# Patient Record
Sex: FEMALE | Race: BLACK OR AFRICAN AMERICAN | Employment: FULL TIME | URBAN - METROPOLITAN AREA
[De-identification: names, ages, dates, MRNs, and addresses within clinical notes are randomized per-mention and may not be internally consistent; named-entity substitution may affect disease eponyms.]

---

## 2017-02-10 ENCOUNTER — OFFICE VISIT (OUTPATIENT)
Dept: FAMILY MEDICINE CLINIC | Age: 27
End: 2017-02-10

## 2017-02-10 VITALS
SYSTOLIC BLOOD PRESSURE: 109 MMHG | WEIGHT: 141 LBS | HEART RATE: 80 BPM | HEIGHT: 63 IN | OXYGEN SATURATION: 99 % | RESPIRATION RATE: 20 BRPM | TEMPERATURE: 98 F | DIASTOLIC BLOOD PRESSURE: 76 MMHG | BODY MASS INDEX: 24.98 KG/M2

## 2017-02-10 DIAGNOSIS — M67.471 GANGLION CYST OF RIGHT FOOT: ICD-10-CM

## 2017-02-10 DIAGNOSIS — S99.921A RIGHT FOOT INJURY, INITIAL ENCOUNTER: Primary | ICD-10-CM

## 2017-02-10 NOTE — PROGRESS NOTES
HISTORY OF PRESENT ILLNESS  Charlotte Stewart is a 27 y.o. female. HPI  Charlotte Stewart is a 27 y.o. female who presents to the office today for toe injury. She is a new patient. Her Mother in law and grandmother are patients here. She comes in with c/o right foot pain. Yesterday she was vacuuming and was pulling open a door and the door slammed into the lateral side of her right foot and pinky. 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 Since the injury she has not been able to stand on her right foot. There is swelling and bruising. She did not take medication for the pain. Chief Complaint   Patient presents with   Judieth Marleny Establish Care    Foot Pain     No current outpatient prescriptions on file prior to visit. No current facility-administered medications on file prior to visit. No Known Allergies  No past medical history on file. History   Smoking Status    Never Smoker   Smokeless Tobacco    Never Used     History   Alcohol Use No     No family history on file. Review of Systems   Musculoskeletal: Positive for joint pain. Right foot and toes   Neurological: Negative for tingling, sensory change and focal weakness. Visit Vitals    /76 (BP 1 Location: Right arm, BP Patient Position: Sitting)    Pulse 80    Temp 98 °F (36.7 °C) (Oral)    Resp 20    Ht 5' 3\" (1.6 m)    Wt 141 lb (64 kg)    LMP 01/01/2017 (Exact Date)    SpO2 99%    BMI 24.98 kg/m2     Physical Exam   Constitutional: She is oriented to person, place, and time. She appears well-developed and well-nourished. No distress. Cardiovascular: Normal rate. Pulmonary/Chest: Effort normal. No respiratory distress. Musculoskeletal: She exhibits no edema. Right ankle: Normal.        Right foot: There is decreased range of motion, tenderness, bony tenderness and swelling. There is no deformity.         Feet:    limping   Neurological: She is alert and oriented to person, place, and time. Psychiatric: She has a normal mood and affect. Her behavior is normal. Thought content normal.   Nursing note and vitals reviewed. ASSESSMENT and PLAN    ICD-10-CM ICD-9-CM    1. Right foot injury, initial encounter S99.921A 959.7 XR FOOT RT MIN 3 V   2. Ganglion cyst of right foot M67.471 727.43      Xray of right foot. Will call with results. Rt foot was wrapped in an ace bandage. She will take NSAIDs for pain and swelling, keep foot elevated and apply ice. I gave her a work note to return on Monday if tolerable. Patient agrees with Plan and verbalizes understanding. Follow-up Disposition:  Return in about 3 weeks (around 3/3/2017) for foot injury/pain.       Joyce Hidalgo PA-C  02/10/17

## 2017-02-10 NOTE — LETTER
NOTIFICATION RETURN TO WORK  
 
2/10/2017 2:52 PM 
 
Ms. Sweetie Peres [address] [address] To Whom It May Concern: 
 
Sweetie Peres is currently under the care of Floyd Dickerson. She will return to work on: 2/13/2017 If there are questions or concerns please have the patient contact our office.  
 
 
 
Sincerely, 
 
 
Carey Bernal PA-C

## 2017-02-10 NOTE — PATIENT INSTRUCTIONS

## 2017-02-10 NOTE — PROGRESS NOTES
Renetta Singleton is a 27 y.o. female in today to establish care. Patient has c/o right small toe injury. Learning assessment completed; primary language is [place]. Fall Risk Assessment, last 12 mths 2/10/2017   Able to walk? Yes   Fall in past 12 months? Yes   Fall with injury?  Yes   Number of falls in past 12 months 1   Fall Risk Score 2

## 2017-02-10 NOTE — MR AVS SNAPSHOT
Visit Information Date & Time Provider Department Dept. Phone Encounter #  
 2/10/2017  2:00 PM Samm Blackman PA-C [address] [phone] [id number] Follow-up Instructions Return in about 3 weeks (around 3/3/2017) for foot injury/pain. Upcoming Health Maintenance Date Due DTaP/Tdap/Td series (1 - Tdap) 5/28/2009 PAP AKA CERVICAL CYTOLOGY 5/28/2009 Allergies as of 2/10/2017  Review Complete On: 2/10/2017 By: Samm Blackman PA-C No Known Allergies Current Immunizations  Never Reviewed No immunizations on file. Not reviewed this visit You Were Diagnosed With   
  
 Codes Comments Right foot injury, initial encounter    -  Primary ICD-10-CM: H78.055B ICD-9-CM: 203. 7 Vitals BP Pulse Temp Resp Height(growth percentile) Weight(growth percentile) 109/76 (BP 1 Location: Right arm, BP Patient Position: Sitting) 80 98 °F (36.7 °C) (Oral) 20 5' 3\" (1.6 m) 141 lb (64 kg) LMP SpO2 BMI OB Status Smoking Status 01/01/2017 (Exact Date) 99% 24.98 kg/m2 Having regular periods Never Smoker Vitals History BMI and BSA Data Body Mass Index Body Surface Area 24.98 kg/m 2 1.69 m 2 Your Updated Medication List  
  
Notice  As of 2/10/2017  2:53 PM  
 You have not been prescribed any medications. Follow-up Instructions Return in about 3 weeks (around 3/3/2017) for foot injury/pain. To-Do List   
 02/10/2017 Imaging:  XR FOOT RT MIN 3 V Patient Instructions Foot Pain: Care Instructions Your Care Instructions Foot injuries that cause pain and swelling are fairly common. Almost all sports or home repair projects can cause a misstep that ends up as foot pain. Normal wear and tear, especially as you get older, also can cause foot pain.  
Most minor foot injuries will heal on their own, and home treatment is usually all you need to do. If you have a severe injury, you may need tests and treatment. Follow-up care is a key part of your treatment and safety. Be sure to make and go to all appointments, and call your doctor if you are having problems. Its also a good idea to know your test results and keep a list of the medicines you take. How can you care for yourself at home? · Take pain medicines exactly as directed. ¨ If the doctor gave you a prescription medicine for pain, take it as prescribed. ¨ If you are not taking a prescription pain medicine, ask your doctor if you can take an over-the-counter medicine. · Rest and protect your foot. Take a break from any activity that may cause pain. · Put ice or a cold pack on your foot for 10 to 20 minutes at a time. Put a thin cloth between the ice and your skin. · Prop up the sore foot on a pillow when you ice it or anytime you sit or lie down during the next 3 days. Try to keep it above the level of your heart. This will help reduce swelling. · Your doctor may recommend that you wrap your foot with an elastic bandage. Keep your foot wrapped for as long as your doctor advises. · If your doctor recommends crutches, use them as directed. · Wear roomy footwear. · As soon as pain and swelling end, begin gentle exercises of your foot. Your doctor can tell you which exercises will help. When should you call for help? Call 911 anytime you think you may need emergency care. For example, call if: 
· Your foot turns pale, white, blue, or cold. Call your doctor now or seek immediate medical care if: 
· You cannot move or stand on your foot. · Your foot looks twisted or out of its normal position. · Your foot is not stable when you step down. · You have signs of infection, such as: 
¨ Increased pain, swelling, warmth, or redness. ¨ Red streaks leading from the sore area. ¨ Pus draining from a place on your foot. ¨ A fever. · Your foot is numb or tingly. Watch closely for changes in your health, and be sure to contact your doctor if: 
· You do not get better as expected. · You have bruises from an injury that last longer than 2 weeks. Where can you learn more? Go to http://anum-dannielle.info/. Enter F007 in the search box to learn more about \"Foot Pain: Care Instructions. \" Current as of: May 23, 2016 Content Version: 11.1 © 9961-0502 [employer]aki. Care instructions adapted under license by [employer] (which disclaims liability or warranty for this information). If you have questions about a medical condition or this instruction, always ask your healthcare professional. [address] any warranty or liability for your use of this information. Introducing [facility]! Terence Trujillo introduces Little Big Things patient portal. Now you can access parts of your medical record, email your doctor's office, and request medication refills online. 1. In your internet browser, go to [email or web address] 2. Click on the First Time User? Click Here link in the Sign In box. You will see the New Member Sign Up page. 3. Enter your Little Big Things Access Code exactly as it appears below. You will not need to use this code after youve completed the sign-up process. If you do not sign up before the expiration date, you must request a new code. · Little Big Things Access Code: [email or web address] Expires: 5/11/2017  2:53 PM 
 
4. Enter the last four digits of your Social Security Number (xxxx) and Date of Birth (mm/dd/yyyy) as indicated and click Submit. You will be taken to the next sign-up page. 5. Create a Cyntellectt ID. This will be your Little Big Things login ID and cannot be changed, so think of one that is secure and easy to remember. 6. Create a Little Big Things password. You can change your password at any time. 7. Enter your Password Reset Question and Answer.  This can be used at a later time if you forget your password. 8. Enter your e-mail address. You will receive e-mail notification when new information is available in [address] 9. Click Sign Up. You can now view and download portions of your medical record. 10. Click the Download Summary menu link to download a portable copy of your medical information. If you have questions, please visit the Frequently Asked Questions section of the Kanbox website. Remember, Kanbox is NOT to be used for urgent needs. For medical emergencies, dial 911. Now available from your iPhone and Android! Please provide this summary of care documentation to your next provider. Your primary care clinician is listed as Kodak Lyn. If you have any questions after today's visit, please call [phone].

## 2017-02-16 DIAGNOSIS — M79.671 RIGHT FOOT PAIN: ICD-10-CM

## 2017-02-16 DIAGNOSIS — T14.8XXA AVULSION FRACTURE OF BONE: Primary | ICD-10-CM

## 2017-02-28 ENCOUNTER — CLINICAL SUPPORT (OUTPATIENT)
Dept: FAMILY MEDICINE CLINIC | Age: 27
End: 2017-02-28

## 2017-02-28 DIAGNOSIS — Z11.1 ENCOUNTER FOR PPD TEST: Primary | ICD-10-CM

## 2017-02-28 NOTE — PROGRESS NOTES
William Duque is a 27 y.o. female in today for PPD placement only. Patient tolerated well, in no apparent distress, aware of need to return for reading in 48-72 hours.

## 2017-03-03 ENCOUNTER — CLINICAL SUPPORT (OUTPATIENT)
Dept: FAMILY MEDICINE CLINIC | Age: 27
End: 2017-03-03

## 2017-03-03 DIAGNOSIS — Z11.1 ENCOUNTER FOR PPD SKIN TEST READING: Primary | ICD-10-CM

## 2017-03-03 LAB
MM INDURATION POC: 0 MM (ref 0–5)
PPD POC: NEGATIVE NEGATIVE

## 2017-03-03 NOTE — PROGRESS NOTES
Stacy Huizar is a 27 y.o. female here this morning to have her PPD. Test was negative with 0 mm of induration. Results printed and given to patient.

## 2017-04-04 ENCOUNTER — OFFICE VISIT (OUTPATIENT)
Dept: FAMILY MEDICINE CLINIC | Age: 27
End: 2017-04-04

## 2017-04-04 VITALS
WEIGHT: 139.4 LBS | TEMPERATURE: 98.5 F | DIASTOLIC BLOOD PRESSURE: 63 MMHG | OXYGEN SATURATION: 99 % | HEART RATE: 78 BPM | BODY MASS INDEX: 24.7 KG/M2 | HEIGHT: 63 IN | SYSTOLIC BLOOD PRESSURE: 113 MMHG | RESPIRATION RATE: 18 BRPM

## 2017-04-04 DIAGNOSIS — Z13.220 SCREENING, LIPID: ICD-10-CM

## 2017-04-04 DIAGNOSIS — M25.562 CHRONIC PAIN OF LEFT KNEE: ICD-10-CM

## 2017-04-04 DIAGNOSIS — Z01.419 WELL WOMAN EXAM WITH ROUTINE GYNECOLOGICAL EXAM: Primary | ICD-10-CM

## 2017-04-04 DIAGNOSIS — Z12.4 SCREENING FOR CERVICAL CANCER: ICD-10-CM

## 2017-04-04 DIAGNOSIS — Z11.3 SCREENING FOR STD (SEXUALLY TRANSMITTED DISEASE): ICD-10-CM

## 2017-04-04 DIAGNOSIS — G89.29 CHRONIC PAIN OF LEFT KNEE: ICD-10-CM

## 2017-04-04 DIAGNOSIS — Z01.419 WELL WOMAN EXAM WITH ROUTINE GYNECOLOGICAL EXAM: ICD-10-CM

## 2017-04-04 NOTE — MR AVS SNAPSHOT
Visit Information Date & Time Provider Department Dept. Phone Encounter #  
 4/4/2017  2:30 PM Addi Dutta PA-C [employer] [phone] [id number] Follow-up Instructions Return in about 1 year (around 4/4/2018) for Well Woman. Follow-up and Disposition History Upcoming Health Maintenance Date Due  
 PAP AKA CERVICAL CYTOLOGY 4/4/2020 DTaP/Tdap/Td series (2 - Td) 11/4/2023 Allergies as of 4/4/2017  Review Complete On: 4/4/2017 By: Addi Dutta PA-C No Known Allergies Current Immunizations  Reviewed on 2/28/2017 Name Date  
 TB Skin Test (PPD) Intradermal 2/28/2017  4:45 PM  
  
 Not reviewed this visit You Were Diagnosed With   
  
 Codes Comments Well woman exam with routine gynecological exam    -  Primary ICD-10-CM: F97.543 ICD-9-CM: V72.31 Screening for cervical cancer     ICD-10-CM: Z12.4 ICD-9-CM: V76.2 Screening, lipid     ICD-10-CM: C06.977 ICD-9-CM: V77.91 Chronic pain of left knee     ICD-10-CM: M25.562, G89.29 ICD-9-CM: 719.46, 338.29 Screening for STD (sexually transmitted disease)     ICD-10-CM: Z11.3 ICD-9-CM: V74.5 Vitals BP Pulse Temp Resp Height(growth percentile) Weight(growth percentile) 113/63 (BP 1 Location: Left arm, BP Patient Position: Sitting) 78 98.5 °F (36.9 °C) (Oral) 18 5' 3\" (1.6 m) 139 lb 6.4 oz (63.2 kg) LMP SpO2 BMI OB Status Smoking Status 03/17/2017 (Exact Date) 99% 24.69 kg/m2 Having regular periods Never Smoker Vitals History BMI and BSA Data Body Mass Index Body Surface Area  
 24.69 kg/m 2 1.68 m 2 Your Updated Medication List  
  
Notice  As of 4/4/2017  3:14 PM  
 You have not been prescribed any medications. We Performed the Following CBC WITH AUTOMATED DIFF [06684 CPT(R)] Follow-up Instructions Return in about 1 year (around 4/4/2018) for Well Woman. To-Do List   
 04/04/2017 Lab:  BV+CT+NG+TV BY JUAN + YEAST   
  
 04/04/2017 Lab:  HIV 1/2 AB SCREEN W RFLX CONFIRM   
  
 04/04/2017 Lab:  LIPID PANEL   
  
 04/04/2017 Lab:  METABOLIC PANEL, COMPREHENSIVE   
  
 04/04/2017 Pathology:  PAP IG, CT-NG-TV, APTIMA HPV AND [address] 14[birth year]30[birth year]([id number]) 04/04/2017 Lab:  RPR   
  
 04/04/2017 Lab:  TSH 3RD GENERATION   
  
 04/04/2017 Lab:  VITAMIN D, 25 HYDROXY Patient Instructions Learning About Pap Tests What is a Pap test? 
The Pap test (also called a Pap smear) is a screening test for cancer of the cervix, which is the lower part of the uterus that opens into the vagina. The test can help your doctor find early changes in the cells that could lead to cancer. During the test, the doctor or nurse will insert a tool called a speculum into your vagina. The speculum gently spreads apart the vaginal walls. That allows your doctor to see inside the vagina and the cervix. He or she uses a cotton swab or brush to collect cell samples from your cervix. Try to schedule the test when you're not having your period. To get ready for a Pap test, avoid douches, tampons, vaginal medicines, sprays, or powders for at least a day before you have the test. 
When should you have a Pap test? 
Women should start having Pap tests at age 24. If you are younger than 24 and are sexually active, it's still a good idea to have regular testing for sexually transmitted infections. · Women 21 to 30 should have Pap tests every 3 years. · Women 30 to 72 may continue to have a Pap test every 3 years as long as their results are normal. Or they can choose to have a Pap test and an HPV test. This is called co-testing. With co-testing, women can have screening every 5 years as long as their test results are normal. 
· Women 72 and older may no longer need Pap tests.  When to stop having Pap tests depends on your medical history, your overall health, and your risk of cervical cell changes or cervical cancer. Talk with your doctor about whether you should stop or continue to have Pap tests. He or she can help you decide. These recommendations do not apply to women who have had a serious abnormal Pap test result or who have certain health problems. Talk to your doctor about how often you should be tested. There is also a newer test called a primary HPV test. It is used in women ages 22 and older. And it is done every 3 years, as long as a woman's test results are normal. A woman who has this test doesn't need to have a Pap test. 
Having the HPV vaccine does not change your need for screening tests. Women who have had the HPV vaccine should follow the same screening schedules as women who have not had the vaccine. What happens after the test? 
The sample of cells taken during your test will be sent to a lab so that an expert can look at the cells. It usually takes a week or two to get the results back. · A normal result means that the test did not find any abnormal cells in the sample. · An abnormal result can mean many things. Most of these are not cancer. The results of your test may be abnormal because: 
¨ You have an infection of the vagina or cervix, such as a yeast infection. ¨ You have an IUD (intrauterine device for birth control). ¨ You have low estrogen levels after menopause that are causing the cells to change. ¨ You have cell changes that may be a sign of precancer or cancer. The results are ranked based on how serious the changes might be. Where can you learn more? Go to http://anum-dannielle.info/. Enter P919 in the search box to learn more about \"Learning About Pap Tests. \" Current as of: July 26, 2016 Content Version: 11.2 © 7206-1611 [employer] Factory.  Care instructions adapted under license by [employer] (which disclaims liability or warranty for this information). If you have questions about a medical condition or this instruction, always ask your healthcare professional. [address] any warranty or liability for your use of this information. Introducing [facility] SERVICES! Roslynjose Cohn introduces Elance patient portal. Now you can access parts of your medical record, email your doctor's office, and request medication refills online. 1. In your internet browser, go to [email or web address] 2. Click on the First Time User? Click Here link in the Sign In box. You will see the New Member Sign Up page. 3. Enter your Elance Access Code exactly as it appears below. You will not need to use this code after youve completed the sign-up process. If you do not sign up before the expiration date, you must request a new code. · Elance Access Code: [email or web address] Expires: 5/11/2017  3:53 PM 
 
4. Enter the last four digits of your Social Security Number (xxxx) and Date of Birth (mm/dd/yyyy) as indicated and click Submit. You will be taken to the next sign-up page. 5. Create a Elance ID. This will be your Elance login ID and cannot be changed, so think of one that is secure and easy to remember. 6. Create a Elance password. You can change your password at any time. 7. Enter your Password Reset Question and Answer. This can be used at a later time if you forget your password. 8. Enter your e-mail address. You will receive e-mail notification when new information is available in [address] 9. Click Sign Up. You can now view and download portions of your medical record. 10. Click the Download Summary menu link to download a portable copy of your medical information. If you have questions, please visit the Frequently Asked Questions section of the Elance website. Remember, Elance is NOT to be used for urgent needs. For medical emergencies, dial 911. Now available from your iPhone and Android! Please provide this summary of care documentation to your next provider. Your primary care clinician is listed as Jude Duran. If you have any questions after today's visit, please call [phone].

## 2017-04-04 NOTE — PATIENT INSTRUCTIONS
Learning About Pap Tests  What is a Pap test?  The Pap test (also called a Pap smear) is a screening test for cancer of the cervix, which is the lower part of the uterus that opens into the vagina. The test can help your doctor find early changes in the cells that could lead to cancer. During the test, the doctor or nurse will insert a tool called a speculum into your vagina. The speculum gently spreads apart the vaginal walls. That allows your doctor to see inside the vagina and the cervix. He or she uses a cotton swab or brush to collect cell samples from your cervix. Try to schedule the test when you're not having your period. To get ready for a Pap test, avoid douches, tampons, vaginal medicines, sprays, or powders for at least a day before you have the test.  When should you have a Pap test?  Women should start having Pap tests at age 24. If you are younger than 24 and are sexually active, it's still a good idea to have regular testing for sexually transmitted infections. · Women 21 to 30 should have Pap tests every 3 years. · Women 30 to 72 may continue to have a Pap test every 3 years as long as their results are normal. Or they can choose to have a Pap test and an HPV test. This is called co-testing. With co-testing, women can have screening every 5 years as long as their test results are normal.  · Women 72 and older may no longer need Pap tests. When to stop having Pap tests depends on your medical history, your overall health, and your risk of cervical cell changes or cervical cancer. Talk with your doctor about whether you should stop or continue to have Pap tests. He or she can help you decide. These recommendations do not apply to women who have had a serious abnormal Pap test result or who have certain health problems. Talk to your doctor about how often you should be tested. There is also a newer test called a primary HPV test. It is used in women ages 22 and older.  And it is done every 3 years, as long as a woman's test results are normal. A woman who has this test doesn't need to have a Pap test.  Having the HPV vaccine does not change your need for screening tests. Women who have had the HPV vaccine should follow the same screening schedules as women who have not had the vaccine. What happens after the test?  The sample of cells taken during your test will be sent to a lab so that an expert can look at the cells. It usually takes a week or two to get the results back. · A normal result means that the test did not find any abnormal cells in the sample. · An abnormal result can mean many things. Most of these are not cancer. The results of your test may be abnormal because:  ¨ You have an infection of the vagina or cervix, such as a yeast infection. ¨ You have an IUD (intrauterine device for birth control). ¨ You have low estrogen levels after menopause that are causing the cells to change. ¨ You have cell changes that may be a sign of precancer or cancer. The results are ranked based on how serious the changes might be. Where can you learn more? Go to http://anum-dannielle.info/. Enter P919 in the search box to learn more about \"Learning About Pap Tests. \"  Current as of: July 26, 2016  Content Version: 11.2  © 2812-9278 [employer], [employer]. Care instructions adapted under license by [employer] (which disclaims liability or warranty for this information). If you have questions about a medical condition or this instruction, always ask your healthcare professional. [place] [address] any warranty or liability for your use of this information.

## 2017-04-04 NOTE — PROGRESS NOTES
Subjective:   27 y.o. female for Well Woman Check. Patient's last menstrual period was 2017 (exact date). She is an otherwise healthy young female. Last Pap was approx in . She is A2. HM is UTD. Social History: single partner, contraception - none. . Pertinent past medical hstory: none, no history of HTN, DVT, CAD, DM, liver disease, migraines or smoking. She is still seeing Podiatry for fractured right foot, but says it feels much better. There is no problem list on file for this patient. There are no active problems to display for this patient. No Known Allergies  No past medical history on file. No past surgical history on file. No family history on file. Social History   Substance Use Topics    Smoking status: Never Smoker    Smokeless tobacco: Never Used    Alcohol use No        ROS:  Feeling well. No dyspnea or chest pain on exertion. No abdominal pain, change in bowel habits, black or bloody stools. No urinary tract symptoms. GYN ROS: normal menses, no abnormal bleeding, pelvic pain or discharge, no breast pain or new or enlarging lumps on self exam. No neurological complaints. Objective:     Visit Vitals    /63 (BP 1 Location: Left arm, BP Patient Position: Sitting)    Pulse 78    Temp 98.5 °F (36.9 °C) (Oral)    Resp 18    Ht 5' 3\" (1.6 m)    Wt 139 lb 6.4 oz (63.2 kg)    LMP 2017 (Exact Date)    SpO2 99%    BMI 24.69 kg/m2     The patient appears well, alert, oriented x 3, in no distress. Neck supple. No adenopathy or thyromegaly. Lungs are clear, good air entry, no wheezes, rhonchi or rales. S1 and S2 normal, no murmurs, regular rate and rhythm. Abdomen soft without tenderness, guarding, mass or organomegaly. Extremities show no edema, normal peripheral pulses. Neurological is normal, no focal findings.  Tattoo of four leaf clover on back    BREAST EXAM: breasts appear normal, no suspicious masses, no skin or nipple changes or axillary nodes, there are symmetric fibrous changes in both upper outer quadrants. No axillary lymph nodes    PELVIC EXAM: normal external genitalia, vulva, vagina, cervix, uterus and adnexa, PAP: Pap smear done today    Assessment/Plan:       ICD-10-CM ICD-9-CM    1. Well woman exam with routine gynecological exam Z01.419 V72.31 CBC WITH AUTOMATED DIFF      METABOLIC PANEL, COMPREHENSIVE      LIPID PANEL      TSH 3RD GENERATION   2. Screening for cervical cancer Z12.4 V76.2 PAP IG, CT-NG-TV, APTIMA HPV AND RFX 16/18,45([id number])      BV+CT+NG+TV BY JUAN + YEAST   3. Screening, lipid Z13.220 V77.91 LIPID PANEL   4. Chronic pain of left knee M25.562 719.46 VITAMIN D, 25 HYDROXY    G89.29 338.29    5. Screening for STD (sexually transmitted disease) Z11.3 V74.5 BV+CT+NG+TV BY JUAN + YEAST      HIV 1/2 AB SCREEN W RFLX CONFIRM      RPR     well woman  pap smear with HPV and STDs  counseled on breast self exam, STD prevention and HIV risk factors and prevention  return annually for Well Woman Exam.  Patient agreed with plan and verbalized understanding. Follow-up Disposition:  Return in about 1 year (around 4/4/2018) for Well Woman.     Donna Rivero PA-C  04/04/17

## 2017-04-05 LAB
25(OH)D3+25(OH)D2 SERPL-MCNC: 16.8 NG/ML (ref 30–100)
ALBUMIN SERPL-MCNC: 4.2 G/DL (ref 3.5–5.5)
ALBUMIN/GLOB SERPL: 1.5 {RATIO} (ref 1.2–2.2)
ALP SERPL-CCNC: 66 IU/L (ref 39–117)
ALT SERPL-CCNC: 14 IU/L (ref 0–32)
AST SERPL-CCNC: 15 IU/L (ref 0–40)
BASOPHILS # BLD AUTO: 0 X10E3/UL (ref 0–0.2)
BASOPHILS NFR BLD AUTO: 0 %
BILIRUB SERPL-MCNC: 0.3 MG/DL (ref 0–1.2)
BUN SERPL-MCNC: 12 MG/DL (ref 6–20)
BUN/CREAT SERPL: 16 (ref 9–23)
CALCIUM SERPL-MCNC: 9.5 MG/DL (ref 8.7–10.2)
CHLORIDE SERPL-SCNC: 102 MMOL/L (ref 96–106)
CHOLEST SERPL-MCNC: 177 MG/DL (ref 100–199)
CO2 SERPL-SCNC: 21 MMOL/L (ref 18–29)
CREAT SERPL-MCNC: 0.77 MG/DL (ref 0.57–1)
EOSINOPHIL # BLD AUTO: 0.1 X10E3/UL (ref 0–0.4)
EOSINOPHIL NFR BLD AUTO: 1 %
ERYTHROCYTE [DISTWIDTH] IN BLOOD BY AUTOMATED COUNT: 13.1 % (ref 12.3–15.4)
GLOBULIN SER CALC-MCNC: 2.8 G/DL (ref 1.5–4.5)
GLUCOSE SERPL-MCNC: 71 MG/DL (ref 65–99)
HCT VFR BLD AUTO: 40.8 % (ref 34–46.6)
HDLC SERPL-MCNC: 55 MG/DL
HGB BLD-MCNC: 13.4 G/DL (ref 11.1–15.9)
IMM GRANULOCYTES # BLD: 0 X10E3/UL (ref 0–0.1)
IMM GRANULOCYTES NFR BLD: 0 %
LDLC SERPL CALC-MCNC: 108 MG/DL (ref 0–99)
LYMPHOCYTES # BLD AUTO: 2.8 X10E3/UL (ref 0.7–3.1)
LYMPHOCYTES NFR BLD AUTO: 40 %
MCH RBC QN AUTO: 31.8 PG (ref 26.6–33)
MCHC RBC AUTO-ENTMCNC: 32.8 G/DL (ref 31.5–35.7)
MCV RBC AUTO: 97 FL (ref 79–97)
MONOCYTES # BLD AUTO: 0.6 X10E3/UL (ref 0.1–0.9)
MONOCYTES NFR BLD AUTO: 9 %
NEUTROPHILS # BLD AUTO: 3.4 X10E3/UL (ref 1.4–7)
NEUTROPHILS NFR BLD AUTO: 50 %
PLATELET # BLD AUTO: 274 X10E3/UL (ref 150–379)
POTASSIUM SERPL-SCNC: 4 MMOL/L (ref 3.5–5.2)
PROT SERPL-MCNC: 7 G/DL (ref 6–8.5)
RBC # BLD AUTO: 4.22 X10E6/UL (ref 3.77–5.28)
RPR SER QL: NON REACTIVE
SODIUM SERPL-SCNC: 140 MMOL/L (ref 134–144)
TRIGL SERPL-MCNC: 68 MG/DL (ref 0–149)
TSH SERPL DL<=0.005 MIU/L-ACNC: 0.53 UIU/ML (ref 0.45–4.5)
VLDLC SERPL CALC-MCNC: 14 MG/DL (ref 5–40)
WBC # BLD AUTO: 7 X10E3/UL (ref 3.4–10.8)

## 2017-04-06 DIAGNOSIS — E55.9 VITAMIN D DEFICIENCY: Primary | ICD-10-CM

## 2017-04-06 RX ORDER — ERGOCALCIFEROL 1.25 MG/1
50000 CAPSULE ORAL
Qty: 12 CAP | Refills: 0 | Status: SHIPPED | OUTPATIENT
Start: 2017-04-06 | End: 2018-03-22

## 2017-04-06 NOTE — PROGRESS NOTES
Vitamin D is low. Will send in a prescription for weekly Vit D to her pharmacy and recheck in 3 months.  Otherwise, remainder of labs are normal.

## 2017-04-20 LAB
BACTERIAL SIALIDASE SPEC QL: NORMAL
C TRACH RRNA CVX QL NAA+PROBE: NEGATIVE
C TRACH RRNA SPEC QL NAA+PROBE: NEGATIVE
CYTOLOGIST CVX/VAG CYTO: NORMAL
CYTOLOGY CVX/VAG DOC THIN PREP: NORMAL
DX ICD CODE: NORMAL
HPV I/H RISK 4 DNA CVX QL PROBE+SIG AMP: NEGATIVE
Lab: NORMAL
N GONORRHOEA RRNA CVX QL NAA+PROBE: NEGATIVE
N GONORRHOEA RRNA SPEC QL NAA+PROBE: NEGATIVE
OTHER STN SPEC: NORMAL
PATH REPORT.FINAL DX SPEC: NORMAL
STAT OF ADQ CVX/VAG CYTO-IMP: NORMAL
T VAGINALIS RRNA SPEC QL NAA+PROBE: NEGATIVE
T VAGINALIS RRNA SPEC QL NAA+PROBE: NEGATIVE
YEAST GENITAL QL CULT: NORMAL

## 2017-06-06 ENCOUNTER — OFFICE VISIT (OUTPATIENT)
Dept: FAMILY MEDICINE CLINIC | Age: 27
End: 2017-06-06

## 2017-06-06 VITALS
TEMPERATURE: 97.8 F | HEIGHT: 63 IN | BODY MASS INDEX: 24.45 KG/M2 | RESPIRATION RATE: 18 BRPM | HEART RATE: 80 BPM | SYSTOLIC BLOOD PRESSURE: 119 MMHG | OXYGEN SATURATION: 100 % | DIASTOLIC BLOOD PRESSURE: 88 MMHG | WEIGHT: 138 LBS

## 2017-06-06 DIAGNOSIS — G43.719 INTRACTABLE CHRONIC MIGRAINE WITHOUT AURA AND WITHOUT STATUS MIGRAINOSUS: Primary | ICD-10-CM

## 2017-06-06 RX ORDER — KETOROLAC TROMETHAMINE 30 MG/ML
30 INJECTION, SOLUTION INTRAMUSCULAR; INTRAVENOUS ONCE
Qty: 1 VIAL | Refills: 0
Start: 2017-06-06 | End: 2017-06-06

## 2017-06-06 NOTE — PROGRESS NOTES
HISTORY OF PRESENT ILLNESS  Veto Kwon is a 32 y.o. female. HPI  Veto Kwon is a 32 y.o. female who presents to the office today for headaches. She has been having headaches 2-3 times a week for the past year. This is located over both temples and across the front of her forehead. She has light sensitivity. Sometimes BC powder will help, but it did not last night. Most of the time she needs to sleep it off. It has been a long time since her last eye exam. She does not have a FH of migraines on her mother's side, and does not know her father's FH. She admits that she does not eat or drink enough throughout the day. Chief Complaint   Patient presents with    Headache       Current Outpatient Prescriptions on File Prior to Visit   Medication Sig Dispense Refill    ergocalciferol (ERGOCALCIFEROL) 50,000 unit capsule Take 1 Cap by mouth every seven (7) days. 12 Cap 0     No current facility-administered medications on file prior to visit. No Known Allergies  No past medical history on file. History   Smoking Status    Never Smoker   Smokeless Tobacco    Never Used     History   Alcohol Use No     No family history on file. Review of Systems   Constitutional: Negative for chills, diaphoresis, fever and malaise/fatigue. HENT: Negative for congestion, ear pain, sore throat and tinnitus. Eyes: Positive for photophobia. Negative for blurred vision and pain. Respiratory: Negative for cough and shortness of breath. Cardiovascular: Negative for chest pain and palpitations. Gastrointestinal: Negative for abdominal pain, nausea and vomiting. Musculoskeletal: Positive for neck pain. Tension in neck   Skin: Negative for rash. Neurological: Positive for headaches. Negative for dizziness and tremors. Endo/Heme/Allergies: Positive for environmental allergies. Does not bruise/bleed easily. Psychiatric/Behavioral: Negative for depression. The patient is not nervous/anxious. Worries a lot     Visit Vitals    /88 (BP 1 Location: Right arm, BP Patient Position: Sitting)    Pulse 80    Temp 97.8 °F (36.6 °C) (Oral)    Resp 18    Ht 5' 3\" (1.6 m)    Wt 138 lb (62.6 kg)    LMP 06/01/2017    SpO2 100%    BMI 24.45 kg/m2     Physical Exam   Constitutional: She is oriented to person, place, and time. She appears well-developed and well-nourished. Laying on bed in darkened exam room   HENT:   Right Ear: Tympanic membrane and ear canal normal.   Left Ear: Tympanic membrane and ear canal normal.   Nose: Nose normal.   Mouth/Throat: Uvula is midline, oropharynx is clear and moist and mucous membranes are normal.   Eyes: Conjunctivae, EOM and lids are normal. Pupils are equal, round, and reactive to light. Fundoscopic exam:       The right eye shows no AV nicking, no exudate and no papilledema. The left eye shows no AV nicking, no exudate and no papilledema. Cardiovascular: Normal rate, regular rhythm and normal heart sounds. No murmur heard. Pulses:       Radial pulses are 2+ on the right side, and 2+ on the left side. Pulmonary/Chest: Effort normal and breath sounds normal. No respiratory distress. She has no wheezes. Neurological: She is alert and oriented to person, place, and time. She has normal strength. Gait normal.   Reflex Scores:       Patellar reflexes are 2+ on the right side and 2+ on the left side. Psychiatric: She has a normal mood and affect. Her behavior is normal. Thought content normal.   Nursing note and vitals reviewed. ASSESSMENT and PLAN    ICD-10-CM ICD-9-CM    1. Intractable chronic migraine without aura and without status migrainosus G43.719 346.71 ketorolac (TORADOL) 30 mg/mL (1 mL) injection      She will start to log her headaches and keep a diary. She needs to increase her water intake and eat 3 square meals a day without skipping meals. She should take NSAIDs right at the first sign of a headache.    Perform neck and shoulder stretches and ROM exercises to decrease tension in the neck and shoulders. Try to reduce stress. Reviewed medication and side effects. Patient agrees with the plan and verbalizes understanding. Follow-up Disposition:  Return in about 3 weeks (around 6/27/2017) for headaches.     Mary Darnell PA-C  6/6/2017

## 2017-06-06 NOTE — MR AVS SNAPSHOT
Visit Information Date & Time Provider Department Dept. Phone Encounter #  
 6/6/2017  1:30 PM Addi Dutta PA-C [address] [phone] [id number] Follow-up Instructions Return in about 3 weeks (around 6/27/2017) for headaches. Upcoming Health Maintenance Date Due INFLUENZA AGE 9 TO ADULT 8/1/2017 PAP AKA CERVICAL CYTOLOGY 4/4/2020 DTaP/Tdap/Td series (2 - Td) 11/4/2023 Allergies as of 6/6/2017  Review Complete On: 6/6/2017 By: Archer Pill, LPN No Known Allergies Current Immunizations  Reviewed on 2/28/2017 Name Date  
 TB Skin Test (PPD) Intradermal 2/28/2017  4:45 PM  
  
 Not reviewed this visit You Were Diagnosed With   
  
 Codes Comments Intractable chronic migraine without aura and without status migrainosus    -  Primary ICD-10-CM: P86.174 ICD-9-CM: 346.71 Vitals BP Pulse Temp Resp Height(growth percentile) Weight(growth percentile) 119/88 (BP 1 Location: Right arm, BP Patient Position: Sitting) 80 97.8 °F (36.6 °C) (Oral) 18 5' 3\" (1.6 m) 138 lb (62.6 kg) LMP SpO2 BMI OB Status Smoking Status 06/01/2017 100% 24.45 kg/m2 Having regular periods Never Smoker Vitals History BMI and BSA Data Body Mass Index Body Surface Area  
 24.45 kg/m 2 1.67 m 2 Preferred Pharmacy Pharmacy Name Phone [id number] [address] [place] [address] Av [phone] Your Updated Medication List  
  
   
This list is accurate as of: 6/6/17  1:54 PM.  Always use your most recent med list.  
  
  
  
  
 ergocalciferol 50,000 unit capsule Commonly known as:  ERGOCALCIFEROL Take 1 Cap by mouth every seven (7) days. ketorolac 30 mg/mL (1 mL) injection Commonly known as:  TORADOL  
1 mL by IntraVENous route once for 1 dose. Follow-up Instructions Return in about 3 weeks (around 6/27/2017) for headaches. Patient Instructions Migraine Headache: Care Instructions Your Care Instructions Migraines are painful, throbbing headaches that often start on one side of the head. They may cause nausea and vomiting and make you sensitive to light, sound, or smell. Without treatment, migraines can last from 4 hours to a few days. Medicines can help prevent migraines or stop them after they have started. Your doctor can help you find which ones work best for you. Follow-up care is a key part of your treatment and safety. Be sure to make and go to all appointments, and call your doctor if you are having problems. It's also a good idea to know your test results and keep a list of the medicines you take. How can you care for yourself at home? · Do not drive if you have taken a prescription pain medicine. · Rest in a quiet, dark room until your headache is gone. Close your eyes, and try to relax or go to sleep. Don't watch TV or read. · Put a cold, moist cloth or cold pack on the painful area for 10 to 20 minutes at a time. Put a thin cloth between the cold pack and your skin. · Use a warm, moist towel or a heating pad set on low to relax tight shoulder and neck muscles. · Have someone gently massage your neck and shoulders. · Take your medicines exactly as prescribed. Call your doctor if you think you are having a problem with your medicine. You will get more details on the specific medicines your doctor prescribes. · Be careful not to take pain medicine more often than the instructions allow. You could get worse or more frequent headaches when the medicine wears off. To prevent migraines · Keep a headache diary so you can figure out what triggers your headaches. Avoiding triggers may help you prevent headaches. Record when each headache began, how long it lasted, and what the pain was like.  (Was it throbbing, aching, stabbing, or dull?) Write down any other symptoms you had with the headache, such as nausea, flashing lights or dark spots, or sensitivity to bright light or loud noise. Note if the headache occurred near your period. List anything that might have triggered the headache. Triggers may include certain foods (chocolate, cheese, wine) or odors, smoke, bright light, stress, or lack of sleep. · If your doctor has prescribed medicine for your migraines, take it as directed. You may have medicine that you take only when you get a migraine and medicine that you take all the time to help prevent migraines. ¨ If your doctor has prescribed medicine for when you get a headache, take it at the first sign of a migraine, unless your doctor has given you other instructions. ¨ If your doctor has prescribed medicine to prevent migraines, take it exactly as prescribed. Call your doctor if you think you are having a problem with your medicine. · Find healthy ways to deal with stress. Migraines are most common during or right after stressful times. Take time to relax before and after you do something that has caused a migraine in the past. 
· Try to keep your muscles relaxed by keeping good posture. Check your jaw, face, neck, and shoulder muscles for tension. Try to relax them. When you sit at a desk, change positions often. And make sure to stretch for 30 seconds each hour. · Get plenty of sleep and exercise. · Eat meals on a regular schedule. Avoid foods and drinks that often trigger migraines. These include chocolate, alcohol (especially red wine and port), aspartame, monosodium glutamate (MSG), and some additives found in foods (such as hot dogs, guillen, cold cuts, aged cheeses, and pickled foods). · Limit caffeine. Don't drink too much coffee, tea, or soda. But don't quit caffeine suddenly. That can also give you migraines. · Do not smoke or allow others to smoke around you. If you need help quitting, talk to your doctor about stop-smoking programs and medicines. These can increase your chances of quitting for good. · If you are taking birth control pills or hormone therapy, talk to your doctor about whether they are triggering your migraines. When should you call for help? Call 911 anytime you think you may need emergency care. For example, call if: 
· You have signs of a stroke. These may include: 
¨ Sudden numbness, paralysis, or weakness in your face, arm, or leg, especially on only one side of your body. ¨ Sudden vision changes. ¨ Sudden trouble speaking. ¨ Sudden confusion or trouble understanding simple statements. ¨ Sudden problems with walking or balance. ¨ A sudden, severe headache that is different from past headaches. Call your doctor now or seek immediate medical care if: 
· You have new or worse nausea and vomiting. · You have a new or higher fever. · Your headache gets much worse. Watch closely for changes in your health, and be sure to contact your doctor if: 
· You are not getting better after 2 days (48 hours). Where can you learn more? Go to http://anum-dannielle.info/. Enter J576 in the search box to learn more about \"Migraine Headache: Care Instructions. \" Current as of: October 14, 2016 Content Version: 11.2 © 8869-2888 [employer]. Care instructions adapted under license by [email or web address] (which disclaims liability or warranty for this information). If you have questions about a medical condition or this instruction, always ask your healthcare professional. [place] [address] any warranty or liability for your use of this information. Introducing [facility]! Nile Tovar introduces WAVE (Wireless [employer]) patient portal. Now you can access parts of your medical record, email your doctor's office, and request medication refills online. 1. In your internet browser, go to [email or web address] 2. Click on the First Time User? Click Here link in the Sign In box.  You will see the New Member Sign Up page. 3. Enter your [employer] Access Code exactly as it appears below. You will not need to use this code after youve completed the sign-up process. If you do not sign up before the expiration date, you must request a new code. · [employer] Access Code: [email or web address] Expires: 9/4/2017  1:54 PM 
 
4. Enter the last four digits of your Social Security Number (xxxx) and Date of Birth (mm/dd/yyyy) as indicated and click Submit. You will be taken to the next sign-up page. 5. Create a [employer] ID. This will be your [employer] login ID and cannot be changed, so think of one that is secure and easy to remember. 6. Create a [employer] password. You can change your password at any time. 7. Enter your Password Reset Question and Answer. This can be used at a later time if you forget your password. 8. Enter your e-mail address. You will receive e-mail notification when new information is available in [address]. 9. Click Sign Up. You can now view and download portions of your medical record. 10. Click the Download Summary menu link to download a portable copy of your medical information. If you have questions, please visit the Frequently Asked Questions section of the [employer] website. Remember, [employer] is NOT to be used for urgent needs. For medical emergencies, dial 911. Now available from your iPhone and Android! Please provide this summary of care documentation to your next provider. Your primary care clinician is listed as Laura Vázquez. If you have any questions after today's visit, please call [phone].

## 2017-06-06 NOTE — PATIENT INSTRUCTIONS
Migraine Headache: Care Instructions  Your Care Instructions  Migraines are painful, throbbing headaches that often start on one side of the head. They may cause nausea and vomiting and make you sensitive to light, sound, or smell. Without treatment, migraines can last from 4 hours to a few days. Medicines can help prevent migraines or stop them after they have started. Your doctor can help you find which ones work best for you. Follow-up care is a key part of your treatment and safety. Be sure to make and go to all appointments, and call your doctor if you are having problems. It's also a good idea to know your test results and keep a list of the medicines you take. How can you care for yourself at home? · Do not drive if you have taken a prescription pain medicine. · Rest in a quiet, dark room until your headache is gone. Close your eyes, and try to relax or go to sleep. Don't watch TV or read. · Put a cold, moist cloth or cold pack on the painful area for 10 to 20 minutes at a time. Put a thin cloth between the cold pack and your skin. · Use a warm, moist towel or a heating pad set on low to relax tight shoulder and neck muscles. · Have someone gently massage your neck and shoulders. · Take your medicines exactly as prescribed. Call your doctor if you think you are having a problem with your medicine. You will get more details on the specific medicines your doctor prescribes. · Be careful not to take pain medicine more often than the instructions allow. You could get worse or more frequent headaches when the medicine wears off. To prevent migraines  · Keep a headache diary so you can figure out what triggers your headaches. Avoiding triggers may help you prevent headaches. Record when each headache began, how long it lasted, and what the pain was like.  (Was it throbbing, aching, stabbing, or dull?) Write down any other symptoms you had with the headache, such as nausea, flashing lights or dark spots, or sensitivity to bright light or loud noise. Note if the headache occurred near your period. List anything that might have triggered the headache. Triggers may include certain foods (chocolate, cheese, wine) or odors, smoke, bright light, stress, or lack of sleep. · If your doctor has prescribed medicine for your migraines, take it as directed. You may have medicine that you take only when you get a migraine and medicine that you take all the time to help prevent migraines. ¨ If your doctor has prescribed medicine for when you get a headache, take it at the first sign of a migraine, unless your doctor has given you other instructions. ¨ If your doctor has prescribed medicine to prevent migraines, take it exactly as prescribed. Call your doctor if you think you are having a problem with your medicine. · Find healthy ways to deal with stress. Migraines are most common during or right after stressful times. Take time to relax before and after you do something that has caused a migraine in the past.  · Try to keep your muscles relaxed by keeping good posture. Check your jaw, face, neck, and shoulder muscles for tension. Try to relax them. When you sit at a desk, change positions often. And make sure to stretch for 30 seconds each hour. · Get plenty of sleep and exercise. · Eat meals on a regular schedule. Avoid foods and drinks that often trigger migraines. These include chocolate, alcohol (especially red wine and port), aspartame, monosodium glutamate (MSG), and some additives found in foods (such as hot dogs, guillen, cold cuts, aged cheeses, and pickled foods). · Limit caffeine. Don't drink too much coffee, tea, or soda. But don't quit caffeine suddenly. That can also give you migraines. · Do not smoke or allow others to smoke around you. If you need help quitting, talk to your doctor about stop-smoking programs and medicines. These can increase your chances of quitting for good.   · If you are taking birth control pills or hormone therapy, talk to your doctor about whether they are triggering your migraines. When should you call for help? Call 911 anytime you think you may need emergency care. For example, call if:  · You have signs of a stroke. These may include:  ¨ Sudden numbness, paralysis, or weakness in your face, arm, or leg, especially on only one side of your body. ¨ Sudden vision changes. ¨ Sudden trouble speaking. ¨ Sudden confusion or trouble understanding simple statements. ¨ Sudden problems with walking or balance. ¨ A sudden, severe headache that is different from past headaches. Call your doctor now or seek immediate medical care if:  · You have new or worse nausea and vomiting. · You have a new or higher fever. · Your headache gets much worse. Watch closely for changes in your health, and be sure to contact your doctor if:  · You are not getting better after 2 days (48 hours). Where can you learn more? Go to http://anum-dannielle.info/. Enter S758 in the search box to learn more about \"Migraine Headache: Care Instructions. \"  Current as of: October 14, 2016  Content Version: 11.2  © 7874-4659 [employer]. Care instructions adapted under license by [employer] (which disclaims liability or warranty for this information). If you have questions about a medical condition or this instruction, always ask your healthcare professional. [address] any warranty or liability for your use of this information.

## 2017-06-06 NOTE — PROGRESS NOTES
Rafael Smith is a 32 y.o. female here today for headaches. She states they come 2-3 times a week for a year. No N&V or change in vision. She took a BC last night and that did not help. Learning assessment previously completed. .1. Have you been to the ER, urgent care clinic or hospitalized since your last visit? no    2. Have you seen or consulted any other health care providers outside of the [address] [place] [address] since your last visit (Include any pap smears or colon screening)? no    Do you have an Advanced Directive? no    Would you like information on Advanced Directives?  no

## 2017-09-06 ENCOUNTER — OFFICE VISIT (OUTPATIENT)
Dept: FAMILY MEDICINE CLINIC | Age: 27
End: 2017-09-06

## 2017-09-06 VITALS
BODY MASS INDEX: 24.59 KG/M2 | TEMPERATURE: 97.8 F | WEIGHT: 138.8 LBS | HEIGHT: 63 IN | DIASTOLIC BLOOD PRESSURE: 82 MMHG | RESPIRATION RATE: 18 BRPM | HEART RATE: 88 BPM | OXYGEN SATURATION: 100 % | SYSTOLIC BLOOD PRESSURE: 111 MMHG

## 2017-09-06 DIAGNOSIS — M79.644 THUMB PAIN, RIGHT: ICD-10-CM

## 2017-09-06 DIAGNOSIS — S40.862A INSECT BITE OF ARM, LEFT, INITIAL ENCOUNTER: ICD-10-CM

## 2017-09-06 DIAGNOSIS — S69.91XA HAND INJURY, RIGHT, INITIAL ENCOUNTER: Primary | ICD-10-CM
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
## 2017-09-06 PROBLEM — M79.646 THUMB PAIN: Status: ACTIVE | Noted: 2017-09-06

## 2017-09-06 PROBLEM — S69.90XA HAND INJURY: Status: ACTIVE | Noted: 2017-09-06

## 2017-09-06 NOTE — PROGRESS NOTES
HISTORY OF PRESENT ILLNESS  Suraj Bledsoe is a 32 y.o. female. HPI  Suraj Bledsoe is a 32 y.o. female who presents to the office today for hand pain. She comes in for right hand pain. She fell in a shallow ditch on Thursday and tried to break her fall. She landed on her right hand and her wrist hyperflexed. She rubbed alcohol on her injury, took advil when it first happened. The pain and swelling is persistent and not improving. She works at [employer] in the evenings now and is having a hard time using her right hand for holding, carrying or gripping. She is right handed. Denies numbness, tingling, weakness. She also has two insect bites that occurred on Monday. One is located on left arm and another under right axilla. She says they itch and are warm to touch. She has not taken any medication for this. Chief Complaint   Patient presents with    Hand Injury       Current Outpatient Prescriptions on File Prior to Visit   Medication Sig Dispense Refill    ergocalciferol (ERGOCALCIFEROL) 50,000 unit capsule Take 1 Cap by mouth every seven (7) days. 12 Cap 0     No current facility-administered medications on file prior to visit. No Known Allergies  No past medical history on file. History   Smoking Status    Never Smoker   Smokeless Tobacco    Never Used     History   Alcohol Use No     No family history on file. Review of Systems   Constitutional: Negative for chills and fever. Musculoskeletal: Positive for joint pain. Right thumb   Skin: Positive for itching and rash. Neurological: Negative for tingling, sensory change and focal weakness. Visit Vitals    /82 (BP 1 Location: Right arm, BP Patient Position: Sitting)    Pulse 88    Temp 97.8 °F (36.6 °C) (Oral)    Resp 18    Ht 5' 3\" (1.6 m)    Wt 138 lb 12.8 oz (63 kg)    LMP 08/10/2017 (Approximate)    SpO2 100%    BMI 24.59 kg/m2     Physical Exam   Constitutional: She appears well-developed and well-nourished. No distress. Cardiovascular:   Pulses:       Radial pulses are 2+ on the right side   Musculoskeletal:        Right wrist: Normal.        Right hand: She exhibits decreased range of motion, tenderness and swelling. She exhibits no bony tenderness. Normal sensation noted. Decreased strength noted. Hands:  Tenderness to palpation, swelling over thenar eminence. Decreased ROM of thumb/finger due to pain. Skin:        Localized reaction from insect bites with minimal erythema and warmth   Nursing note and vitals reviewed. ASSESSMENT and PLAN    ICD-10-CM ICD-9-CM    1. Hand injury, right, initial encounter S69.91XA 959.4 XR THUMB RT MIN 2 V   2. Thumb pain, right M79.644 729.5 XR THUMB RT MIN 2 V   3. Insect bite of arm, left, initial encounter S40.862A 913.4     W57. Ludwin Seal A004.4       Xray of right thumb reviewed in office did not show a fracture, with some soft tissue swelling. Take NSAIDs and apply ice. Right hand was wrapped with ace bandage. Localized reaction from insect bite. Take oral benadryl as directed on package and apply otc hydrocortisone to lesions TID. RTC if symptoms do not improve. Reviewed medication and side effects. Patient agrees with the plan and verbalizes understanding. Follow-up Disposition:  Return in about 2 weeks (around 9/20/2017) for hand injury.     Cherelle Romano PA-C  9/6/2017

## 2017-09-06 NOTE — PROGRESS NOTES
Tc Cruz is a 32 y.o. female here this morning because she fell on her right hand last Thursday at work. She has decreased ROM. On Monday night, she states she got bitten by some sort of bug on her left arm and under her right arm pit area. States they are warm to the touch and itch. Learning assessment previously completed. 1. Have you been to the ER, urgent care clinic or hospitalized since your last visit? no     2. Have you seen or consulted any other health care providers outside of the [address] [place] [address] since your last visit (Include any pap smears or colon screening)? no     Do you have an Advanced Directive? no    Would you like information on Advanced Directives?  no

## 2017-09-06 NOTE — PATIENT INSTRUCTIONS
Insect Stings and Bites: Care Instructions  Your Care Instructions  Stings and bites from bees, wasps, ants, and other insects often cause pain, swelling, redness, and itching. In some people, especially children, the redness and swelling may be worse. It may extend several inches beyond the affected area. But in most cases, stings and bites don't cause reactions all over the body. If you have had a reaction to an insect sting or bite, you are at risk for a reaction if you get stung or bitten again. Follow-up care is a key part of your treatment and safety. Be sure to make and go to all appointments, and call your doctor if you are having problems. It's also a good idea to know your test results and keep a list of the medicines you take. How can you care for yourself at home? · Do not scratch or rub the skin where the sting or bite occurred. · Put a cold pack or ice cube on the area. Put a thin cloth between the ice and your skin. For some people, a paste of baking soda mixed with a little water helps relieve pain and decrease the reaction. · Take an over-the-counter antihistamine, such as diphenhydramine (Benadryl) or loratadine (Claritin), to relieve swelling, redness, and itching. Calamine lotion or hydrocortisone cream may also help. Do not give antihistamines to your child unless you have checked with the doctor first.  · Be safe with medicines. If your doctor prescribed medicine for your allergy, take it exactly as prescribed. Call your doctor if you think you are having a problem with your medicine. You will get more details on the specific medicines your doctor prescribes. · Your doctor may prescribe a shot of epinephrine to carry with you in case you have a severe reaction. Learn how and when to give yourself the shot, and keep it with you at all times. Make sure it has not . · Go to the emergency room anytime you have a severe reaction.  Go even if you have given yourself epinephrine and are feeling better. Symptoms can come back. When should you call for help? Call 911 anytime you think you may need emergency care. For example, call if:  · You have symptoms of a severe allergic reaction. These may include:  ¨ Sudden raised, red areas (hives) all over your body. ¨ Swelling of the throat, mouth, lips, or tongue. ¨ Trouble breathing. ¨ Passing out (losing consciousness). Or you may feel very lightheaded or suddenly feel weak, confused, or restless. Call your doctor now or seek immediate medical care if:  · You have symptoms of an allergic reaction not right at the sting or bite, such as:  ¨ A rash or small area of hives (raised, red areas on the skin). ¨ Itching. ¨ Swelling. ¨ Belly pain, nausea, or vomiting. · You have a lot of swelling around the site (such as your entire arm or leg is swollen). · You have signs of infection, such as:  ¨ Increased pain, swelling, redness, or warmth around the sting. ¨ Red streaks leading from the area. ¨ Pus draining from the sting. ¨ A fever. Watch closely for changes in your health, and be sure to contact your doctor if:  · You do not get better as expected. Where can you learn more? Go to http://anum-dannielle.info/. Enter P390 in the search box to learn more about \"Insect Stings and Bites: Care Instructions. \"  Current as of: March 20, 2017  Content Version: 11.3  © 1411-8938 [employer]. Care instructions adapted under license by [employer] (which disclaims liability or warranty for this information). If you have questions about a medical condition or this instruction, always ask your healthcare professional. [place] [address] any warranty or liability for your use of this information.

## 2017-09-20 ENCOUNTER — OFFICE VISIT (OUTPATIENT)
Dept: FAMILY MEDICINE CLINIC | Age: 27
End: 2017-09-20

## 2017-09-20 VITALS
HEART RATE: 87 BPM | OXYGEN SATURATION: 99 % | HEIGHT: 63 IN | SYSTOLIC BLOOD PRESSURE: 108 MMHG | BODY MASS INDEX: 24.24 KG/M2 | TEMPERATURE: 98 F | WEIGHT: 136.8 LBS | RESPIRATION RATE: 18 BRPM | DIASTOLIC BLOOD PRESSURE: 79 MMHG

## 2017-09-20 DIAGNOSIS — L70.9 ACNE, UNSPECIFIED ACNE TYPE: ICD-10-CM

## 2017-09-20 DIAGNOSIS — S69.91XD HAND INJURY, RIGHT, SUBSEQUENT ENCOUNTER: ICD-10-CM

## 2017-09-20 DIAGNOSIS — M79.644 THUMB PAIN, RIGHT: Primary | ICD-10-CM

## 2017-09-20 DIAGNOSIS — L70.0 CLOSED COMEDONE: ICD-10-CM
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
## 2017-09-20 PROBLEM — S40.862A INSECT BITE OF ARM, LEFT: Status: RESOLVED | Noted: 2017-09-06 | Resolved: 2017-09-20

## 2017-09-20 RX ORDER — ADAPALENE 0.1 G/100G
CREAM TOPICAL
Qty: 45 G | Refills: 0 | Status: SHIPPED | OUTPATIENT
Start: 2017-09-20 | End: 2018-03-22

## 2017-09-20 NOTE — PROGRESS NOTES
Johnathon Nye is a 32 y.o. female here this morning for a follow up on her hand pain. She states it is better but still not right. Learning assessment previously completed. 1. Have you been to the ER, urgent care clinic or hospitalized since your last visit? no     2. Have you seen or consulted any other health care providers outside of the [address] [place] [address] since your last visit (Include any pap smears or colon screening)? no    Do you have an Advanced Directive? No    Would you like information on Advanced Directives?  no

## 2017-09-20 NOTE — PROGRESS NOTES
HISTORY OF PRESENT ILLNESS  Rimma Wilson is a 32 y.o. female. HPI  Rimma Wilson is a 32 y.o. female who presents to the office today for hand pain. She comes in for follow up on her right hand injury and right thumb pain. She has been keeping her hand wrapped in thumb spica but only at night time because she takes it off when she is at work. She is working at [employer] and is right-handed, so she is using her injured thumb all day. She experiences increased pain after her shifts. She is still using NSAIDs for pain relief. The swelling has improved. She also is concerned about the acne on her face. She has been using proactive, but feels like this is making her break out even more. She has pustular lesions on her face and closed comedones. Chief Complaint   Patient presents with    Hand Injury       Current Outpatient Prescriptions on File Prior to Visit   Medication Sig Dispense Refill    ergocalciferol (ERGOCALCIFEROL) 50,000 unit capsule Take 1 Cap by mouth every seven (7) days. 12 Cap 0     No current facility-administered medications on file prior to visit. No Known Allergies  No past medical history on file. History   Smoking Status    Never Smoker   Smokeless Tobacco    Never Used     History   Alcohol Use No     No family history on file. Review of Systems   Constitutional: Negative for malaise/fatigue. Musculoskeletal: Positive for joint pain. Right thumb   Skin:        Acne on face   Neurological: Negative for tingling, sensory change and focal weakness. Visit Vitals    /79 (BP 1 Location: Right arm, BP Patient Position: Sitting)    Pulse 87    Temp 98 °F (36.7 °C) (Oral)    Resp 18    Ht 5' 3\" (1.6 m)    Wt 136 lb 12.8 oz (62.1 kg)    LMP 09/10/2017 (Approximate)    SpO2 99%    BMI 24.23 kg/m2     Physical Exam   Constitutional: She is oriented to person, place, and time. She appears well-developed and well-nourished. No distress. Musculoskeletal:        Right hand: She exhibits tenderness. She exhibits no bony tenderness, normal capillary refill, no deformity and no swelling. Normal sensation noted. Normal strength noted. Hands:  Swelling resolved over thenar eminence. Not as tender to palpation, full passive ROM of right hand and thumb, but limited active ROM of right thumb due to pain   Neurological: She is alert and oriented to person, place, and time. Skin: Skin is warm, dry and intact. Few pustular lesions on forehead, left temple and right cheek. Multiple closed comedones    Psychiatric: She has a normal mood and affect. Her behavior is normal.   Nursing note and vitals reviewed. ASSESSMENT and PLAN    ICD-10-CM ICD-9-CM    1. Thumb pain, right M79.644 729.5    2. Hand injury, right, subsequent encounter S69.91XD V58.89      959.4    3. Acne, unspecified acne type L70.9 706.1 adapalene (DIFFERIN) 0.1 % topical cream   4. Closed comedone L70.0 706.1       Right hand placed in wrist brace with thumb spica. She will be able to tolerate this better while at work. Continue NSAIDs prn. F/U in 4 weeks  Stop all ProActive products. Start using otc facial cleanser with salicylic acid daily and differin topical to facial lesions at bedtime. I have spent over 25 minutes in face to face time with this pt in discussion with respect to the aforementioned problems, diagnoses and management plans. >50% of the time was spent counseling and coordinating care. Reviewed medication and side effects. Patient agrees with the plan and verbalizes understanding. Follow-up Disposition:  Return in about 4 weeks (around 10/18/2017) for thumb injury, acne.     Niurka Grayson PA-C  9/20/2017

## 2018-01-11 ENCOUNTER — OFFICE VISIT (OUTPATIENT)
Dept: FAMILY MEDICINE CLINIC | Age: 28
End: 2018-01-11

## 2018-01-11 VITALS
HEART RATE: 84 BPM | BODY MASS INDEX: 22.86 KG/M2 | WEIGHT: 129 LBS | HEIGHT: 63 IN | DIASTOLIC BLOOD PRESSURE: 74 MMHG | SYSTOLIC BLOOD PRESSURE: 106 MMHG | OXYGEN SATURATION: 99 % | TEMPERATURE: 98.4 F | RESPIRATION RATE: 16 BRPM

## 2018-01-11 DIAGNOSIS — R50.9 FEVER, UNSPECIFIED FEVER CAUSE: ICD-10-CM

## 2018-01-11 DIAGNOSIS — J06.9 VIRAL UPPER RESPIRATORY TRACT INFECTION: ICD-10-CM

## 2018-01-11 DIAGNOSIS — R11.0 NAUSEA: ICD-10-CM

## 2018-01-11 DIAGNOSIS — R63.0 DECREASED APPETITE: ICD-10-CM

## 2018-01-11 DIAGNOSIS — R53.83 FATIGUE, UNSPECIFIED TYPE: ICD-10-CM

## 2018-01-11 DIAGNOSIS — R52 BODY ACHES: Primary | ICD-10-CM

## 2018-01-11 LAB
HCG URINE, QL. (POC): NEGATIVE
QUICKVUE INFLUENZA TEST: NEGATIVE
VALID INTERNAL CONTROL?: YES
VALID INTERNAL CONTROL?: YES

## 2018-01-11 NOTE — PATIENT INSTRUCTIONS
Fatigue: Care Instructions  Your Care Instructions    Fatigue is a feeling of tiredness, exhaustion, or lack of energy. You may feel fatigue because of too much or not enough activity. It can also come from stress, lack of sleep, boredom, and poor diet. Many medical problems, such as viral infections, can cause fatigue. Emotional problems, especially depression, are often the cause of fatigue. Fatigue is most often a symptom of another problem. Treatment for fatigue depends on the cause. For example, if you have fatigue because you have a certain health problem, treating this problem also treats your fatigue. If depression or anxiety is the cause, treatment may help. Follow-up care is a key part of your treatment and safety. Be sure to make and go to all appointments, and call your doctor if you are having problems. It's also a good idea to know your test results and keep a list of the medicines you take. How can you care for yourself at home? · Get regular exercise. But don't overdo it. Go back and forth between rest and exercise. · Get plenty of rest.  · Eat a healthy diet. Do not skip meals, especially breakfast.  · Reduce your use of caffeine, tobacco, and alcohol. Caffeine is most often found in coffee, tea, cola drinks, and chocolate. · Limit medicines that can cause fatigue. This includes tranquilizers and cold and allergy medicines. When should you call for help? Watch closely for changes in your health, and be sure to contact your doctor if:  ? · You have new symptoms such as fever or a rash. ? · Your fatigue gets worse. ? · You have been feeling down, depressed, or hopeless. Or you may have lost interest in things that you usually enjoy. ? · You are not getting better as expected. Where can you learn more? Go to http://anum-dannielle.info/. Enter W263 in the search box to learn more about \"Fatigue: Care Instructions. \"  Current as of: March 20, 2017  Content Version: 11.4  © 8211-0895 [employer]. Care instructions adapted under license by [employer] (which disclaims liability or warranty for this information). If you have questions about a medical condition or this instruction, always ask your healthcare professional. [address] any warranty or liability for your use of this information. Viral Respiratory Infection: Care Instructions  Your Care Instructions    Viruses are very small organisms. They grow in number after they enter your body. There are many types that cause different illnesses, such as colds and the mumps. The symptoms of a viral respiratory infection often start quickly. They include a fever, sore throat, and runny nose. You may also just not feel well. Or you may not want to eat much. Most viral respiratory infections are not serious. They usually get better with time and self-care. Antibiotics are not used to treat a viral infection. That's because antibiotics will not help cure a viral illness. In some cases, antiviral medicine can help your body fight a serious viral infection. Follow-up care is a key part of your treatment and safety. Be sure to make and go to all appointments, and call your doctor if you are having problems. It's also a good idea to know your test results and keep a list of the medicines you take. How can you care for yourself at home? · Rest as much as possible until you feel better. · Be safe with medicines. Take your medicine exactly as prescribed. Call your doctor if you think you are having a problem with your medicine. You will get more details on the specific medicine your doctor prescribes. · Take an over-the-counter pain medicine, such as acetaminophen (Tylenol), ibuprofen (Advil, Motrin), or naproxen (Aleve), as needed for pain and fever. Read and follow all instructions on the label. Do not give aspirin to anyone younger than 20.  It has been linked to Reye syndrome, a serious illness. · Drink plenty of fluids, enough so that your urine is light yellow or clear like water. Hot fluids, such as tea or soup, may help relieve congestion in your nose and throat. If you have kidney, heart, or liver disease and have to limit fluids, talk with your doctor before you increase the amount of fluids you drink. · Try to clear mucus from your lungs by breathing deeply and coughing. · Gargle with warm salt water once an hour. This can help reduce swelling and throat pain. Use 1 teaspoon of salt mixed in 1 cup of warm water. · Do not smoke or allow others to smoke around you. If you need help quitting, talk to your doctor about stop-smoking programs and medicines. These can increase your chances of quitting for good. To avoid spreading the virus  · Cough or sneeze into a tissue. Then throw the tissue away. · If you don't have a tissue, use your hand to cover your cough or sneeze. Then clean your hand. You can also cough into your sleeve. · Wash your hands often. Use soap and warm water. Wash for 15 to 20 seconds each time. · If you don't have soap and water near you, you can clean your hands with alcohol wipes or gel. When should you call for help? Call your doctor now or seek immediate medical care if:  ? · You have a new or higher fever. ? · Your fever lasts more than 48 hours. ? · You have trouble breathing. ? · You have a fever with a stiff neck or a severe headache. ? · You are sensitive to light. ? · You feel very sleepy or confused. ? Watch closely for changes in your health, and be sure to contact your doctor if:  ? · You do not get better as expected. Where can you learn more? Go to http://anum-dannielle.info/. Enter T642 in the search box to learn more about \"Viral Respiratory Infection: Care Instructions. \"  Current as of: May 12, 2017  Content Version: 11.4  © 3872-0697 [employer].  Care instructions adapted under license by [employer] (which disclaims liability or warranty for this information). If you have questions about a medical condition or this instruction, always ask your healthcare professional. [address] any warranty or liability for your use of this information.

## 2018-01-11 NOTE — PROGRESS NOTES
Chief Complaint   Patient presents with    Cold Symptoms     pt c/o body aches, headache, fever, chills, productive cough clear in color, chest congestion     1. Have you been to the ER, urgent care clinic since your last visit? Hospitalized since your last visit? No    2. Have you seen or consulted any other health care providers outside of the [address] [place] [address] since your last visit? Include any pap smears or colon screening.  No

## 2018-01-11 NOTE — PROGRESS NOTES
HISTORY OF PRESENT ILLNESS  Carlos Umaña is a 32 y.o. female. HPI  Carlos Umaña is a 32 y.o. female who presents to the office today for fever. She comes in today for c/o URI symptoms. This started Wednesday early morning. She felt feverish, chills and body aches. She also has a predominately dry cough and headache. She denies ill contacts. She is now working three jobs, not eating well because she is over stressed. Her mother in law thinks she is working too much, and wearing herself down. Chief Complaint   Patient presents with    Cold Symptoms     pt c/o body aches, headache, fever, chills, productive cough clear in color, chest congestion       Current Outpatient Prescriptions on File Prior to Visit   Medication Sig Dispense Refill    adapalene (DIFFERIN) 0.1 % topical cream Apply  to affected area nightly. use small amount as directed 45 g 0    ergocalciferol (ERGOCALCIFEROL) 50,000 unit capsule Take 1 Cap by mouth every seven (7) days. 12 Cap 0     No current facility-administered medications on file prior to visit. No Known Allergies  No past medical history on file. History   Smoking Status    Never Smoker   Smokeless Tobacco    Never Used     History   Alcohol Use No     No family history on file. Review of Systems   Constitutional: Positive for chills, fever and malaise/fatigue. Negative for diaphoresis. HENT: Positive for congestion. Negative for ear pain, sinus pain and sore throat. Eyes: Negative for discharge and redness. Respiratory: Positive for cough. Negative for sputum production, shortness of breath and wheezing. Cardiovascular: Negative for chest pain, palpitations and leg swelling. Gastrointestinal: Positive for nausea. Negative for abdominal pain, constipation, diarrhea and vomiting. Genitourinary: Negative for dysuria and flank pain. Musculoskeletal: Positive for myalgias. Negative for falls. Skin: Negative for rash.    Neurological: Positive for headaches. Negative for dizziness and weakness. Endo/Heme/Allergies: Does not bruise/bleed easily. Visit Vitals    /74 (BP 1 Location: Right arm, BP Patient Position: Sitting)    Pulse 84    Temp 98.4 °F (36.9 °C) (Oral)    Resp 16    Ht 5' 3\" (1.6 m)    Wt 129 lb (58.5 kg)    LMP 12/22/2017    SpO2 99%    BMI 22.85 kg/m2     Physical Exam   Constitutional: She is oriented to person, place, and time. She appears well-developed and well-nourished. No distress. HENT:   Right Ear: Tympanic membrane and ear canal normal.   Left Ear: Tympanic membrane and ear canal normal.   Nose: Nose normal.   Mouth/Throat: Uvula is midline, oropharynx is clear and moist and mucous membranes are normal.   Eyes: Conjunctivae are normal.   Neck: Neck supple. No thyromegaly present. Cardiovascular: Normal rate, regular rhythm and normal heart sounds. No murmur heard. Pulmonary/Chest: Effort normal and breath sounds normal. No respiratory distress. She has no wheezes. She has no rales. Abdominal: Soft. She exhibits no distension. There is no tenderness. Musculoskeletal: She exhibits no edema. Lymphadenopathy:     She has no cervical adenopathy. Neurological: She is alert and oriented to person, place, and time. Skin: Skin is warm and dry. Psychiatric: She has a normal mood and affect. Her speech is normal and behavior is normal. Thought content normal.   Nursing note and vitals reviewed. ASSESSMENT and PLAN    ICD-10-CM ICD-9-CM    1. Body aches R52 780.96 AMB POC RAPID INFLUENZA TEST   2. Fever, unspecified fever cause R50.9 780.60 AMB POC RAPID INFLUENZA TEST   3. Viral upper respiratory tract infection J06.9 465.9     B97.89     4. Nausea R11.0 787.02 AMB POC URINE PREGNANCY TEST, VISUAL COLOR COMPARISON   5. Decreased appetite R63.0 783.0 AMB POC URINE PREGNANCY TEST, VISUAL COLOR COMPARISON   6.  Fatigue, unspecified type R53.83 780.79 AMB POC URINE PREGNANCY TEST, VISUAL COLOR COMPARISON Flu test is negative. Urine preg is negative. Will treat symptomatically with delsym for cough, mucinex, humidifier use, increase rest and fluids. Treat pain and body aches with tylenol or motrin. Reviewed medication and side effects. Patient agrees with the plan and verbalizes understanding. Follow-up Disposition:  Return if symptoms worsen or fail to improve.     Kentrell Queen PA-C  1/11/2018

## 2018-02-19 ENCOUNTER — CLINICAL SUPPORT (OUTPATIENT)
Dept: FAMILY MEDICINE CLINIC | Age: 28
End: 2018-02-19

## 2018-02-19 DIAGNOSIS — Z23 NEED FOR TDAP VACCINATION: Primary | ICD-10-CM

## 2018-02-19 NOTE — PROGRESS NOTES
Skylar Almodovar is a 32 y.o. female here today for tetanus. She tolerated well and left showing no s/s of distress.

## 2018-02-20 ENCOUNTER — CLINICAL SUPPORT (OUTPATIENT)
Dept: FAMILY MEDICINE CLINIC | Age: 28
End: 2018-02-20

## 2018-02-20 DIAGNOSIS — Z11.1 SCREENING-PULMONARY TB: Primary | ICD-10-CM

## 2018-02-20 NOTE — PROGRESS NOTES
Elizabeth Kat is a 32 y.o. female here today to have a PPD placed. She was made aware to come back within 48-72 hours to have read. She expressed verbal understanding.

## 2018-02-22 ENCOUNTER — OFFICE VISIT (OUTPATIENT)
Dept: FAMILY MEDICINE CLINIC | Age: 28
End: 2018-02-22

## 2018-02-22 DIAGNOSIS — Z11.1 ENCOUNTER FOR PPD SKIN TEST READING: Primary | ICD-10-CM

## 2018-02-22 LAB
MM INDURATION POC: 0 MM (ref 0–5)
PPD POC: NORMAL NEGATIVE

## 2018-03-01 ENCOUNTER — TELEPHONE (OUTPATIENT)
Dept: FAMILY MEDICINE CLINIC | Age: 28
End: 2018-03-01

## 2018-03-22 ENCOUNTER — OFFICE VISIT (OUTPATIENT)
Dept: FAMILY MEDICINE CLINIC | Age: 28
End: 2018-03-22

## 2018-03-22 VITALS
SYSTOLIC BLOOD PRESSURE: 114 MMHG | WEIGHT: 133.2 LBS | HEART RATE: 94 BPM | BODY MASS INDEX: 23.6 KG/M2 | TEMPERATURE: 98.5 F | RESPIRATION RATE: 18 BRPM | DIASTOLIC BLOOD PRESSURE: 77 MMHG | OXYGEN SATURATION: 98 % | HEIGHT: 63 IN

## 2018-03-22 DIAGNOSIS — R30.0 DYSURIA: ICD-10-CM

## 2018-03-22 DIAGNOSIS — N89.8 VAGINAL DISCHARGE: ICD-10-CM

## 2018-03-22 DIAGNOSIS — N93.9 ABNORMAL UTERINE BLEEDING (AUB): ICD-10-CM

## 2018-03-22 DIAGNOSIS — Z11.3 SCREENING FOR STD (SEXUALLY TRANSMITTED DISEASE): ICD-10-CM

## 2018-03-22 DIAGNOSIS — N93.9 ABNORMAL UTERINE BLEEDING (AUB): Primary | ICD-10-CM

## 2018-03-22 LAB
BILIRUB UR QL STRIP: NEGATIVE
GLUCOSE UR-MCNC: NEGATIVE MG/DL
HCG URINE, QL. (POC): NEGATIVE
KETONES P FAST UR STRIP-MCNC: NEGATIVE MG/DL
PH UR STRIP: 7 [PH] (ref 4.6–8)
PROT UR QL STRIP: NEGATIVE
SP GR UR STRIP: 1.02 (ref 1–1.03)
UA UROBILINOGEN AMB POC: NORMAL (ref 0.2–1)
URINALYSIS CLARITY POC: CLEAR
URINALYSIS COLOR POC: YELLOW
URINE BLOOD POC: NORMAL
URINE LEUKOCYTES POC: NEGATIVE
URINE NITRITES POC: NEGATIVE
VALID INTERNAL CONTROL?: YES

## 2018-03-22 RX ORDER — FLUCONAZOLE 150 MG/1
150 TABLET ORAL DAILY
Qty: 1 TAB | Refills: 0 | Status: SHIPPED | OUTPATIENT
Start: 2018-03-22 | End: 2018-03-23

## 2018-03-22 NOTE — PROGRESS NOTES
HISTORY OF PRESENT ILLNESS  Keren Sanchez is a 32 y.o. female. HPI  Keren Sanchez is a 32 y.o. female who presents to the office today for abnormal bleeding. She comes in for an abnormal menstrual cycle this month. It was darker and lasted longer than normal. She also had abnormal vaginal discharge and a different odor than she is used to. She has sexual intercourse, in a monogamous relationship. She has no concern for STDs but would like to be checked today. There is mild abdominal cramping and a slight burning sensation when she urinates. No fever, back pain, n/v.       Chief Complaint   Patient presents with    Irregular Menses       No current outpatient prescriptions on file prior to visit. No current facility-administered medications on file prior to visit. No Known Allergies  History reviewed. No pertinent past medical history. History   Smoking Status    Never Smoker   Smokeless Tobacco    Never Used     History   Alcohol Use No     History reviewed. No pertinent family history. Review of Systems   Constitutional: Negative for chills, fever and malaise/fatigue. Gastrointestinal: Positive for abdominal pain. Negative for constipation, diarrhea, nausea and vomiting. Genitourinary: Positive for dysuria. Negative for flank pain, frequency, hematuria and urgency. Musculoskeletal: Negative for back pain and myalgias. Skin: Negative for itching and rash. Neurological: Negative for dizziness and headaches. Visit Vitals    /77 (BP 1 Location: Right arm, BP Patient Position: Sitting)    Pulse 94    Temp 98.5 °F (36.9 °C) (Oral)    Resp 18    Ht 5' 3\" (1.6 m)    Wt 133 lb 3.2 oz (60.4 kg)    LMP 03/09/2018 (Exact Date)    SpO2 98%    BMI 23.6 kg/m2     Physical Exam   Constitutional: She is oriented to person, place, and time. She appears well-developed and well-nourished. No distress. Cardiovascular: Normal rate. Pulmonary/Chest: Effort normal.   Abdominal: Soft. She exhibits no distension. There is no tenderness. Genitourinary: Uterus normal. Pelvic exam was performed with patient supine. There is no rash or tenderness on the right labia. There is no rash or tenderness on the left labia. Cervix exhibits no motion tenderness, no discharge and no friability. No erythema or tenderness in the vagina. Vaginal discharge found. Genitourinary Comments: Clumpy white discharge, no odor   Neurological: She is alert and oriented to person, place, and time. Psychiatric: She has a normal mood and affect. Her behavior is normal. Thought content normal.   Nursing note and vitals reviewed. Recent Results (from the past 12 hour(s))   AMB POC URINALYSIS DIP STICK AUTO W/O MICRO    Collection Time: 03/22/18 11:28 AM   Result Value Ref Range    Color (UA POC) Yellow     Clarity (UA POC) Clear     Glucose (UA POC) Negative Negative    Bilirubin (UA POC) Negative Negative    Ketones (UA POC) Negative Negative    Specific gravity (UA POC) 1.020 1.001 - 1.035    Blood (UA POC) Trace Negative    pH (UA POC) 7.0 4.6 - 8.0    Protein (UA POC) Negative Negative    Urobilinogen (UA POC) 1 mg/dL 0.2 - 1    Nitrites (UA POC) Negative Negative    Leukocyte esterase (UA POC) Negative Negative   AMB POC URINE PREGNANCY TEST, VISUAL COLOR COMPARISON    Collection Time: 03/22/18 11:28 AM   Result Value Ref Range    VALID INTERNAL CONTROL POC Yes     HCG urine, Ql. (POC) Negative Negative     ASSESSMENT and PLAN    ICD-10-CM ICD-9-CM    1. Abnormal uterine bleeding (AUB) N93.9 626.9 BV+YEAST CULTURE      CT/NG/T.VAGINALIS AMPLIFICATION      AMB POC URINE PREGNANCY TEST, VISUAL COLOR COMPARISON   2. Vaginal discharge N89.8 623.5 BV+YEAST CULTURE      CT/NG/T.VAGINALIS AMPLIFICATION      AMB POC URINE PREGNANCY TEST, VISUAL COLOR COMPARISON      fluconazole (DIFLUCAN) 150 mg tablet   3. Screening for STD (sexually transmitted disease) Z11.3 V74.5 CT/NG/T.VAGINALIS AMPLIFICATION   4. Dysuria R30.0 788. 1 BV+YEAST CULTURE      CT/NG/T.VAGINALIS AMPLIFICATION      AMB POC URINALYSIS DIP STICK AUTO W/O MICRO      AMB POC URINE PREGNANCY TEST, VISUAL COLOR COMPARISON      Will send in a prescription for diflucan today. Will call with test results and treat as needed. Reviewed medication and side effects. Patient agrees with the plan and verbalizes understanding. Follow-up Disposition:  Return if symptoms worsen or fail to improve.     Cristóbal Ceballos PA-C  3/22/2018

## 2018-03-22 NOTE — PROGRESS NOTES
Bobbi Cabrera is a 32 y.o. female here for abnormal cycles. Little bit of lower abdominal discomfort.

## 2018-03-22 NOTE — PATIENT INSTRUCTIONS
Fluconazole (By mouth)   Fluconazole (nyae-KMG-s-zole)  Prevents and treats fungal infections. Brand Name(s): Diflucan   There may be other brand names for this medicine. When This Medicine Should Not Be Used: This medicine is not right for everyone. Do not use it if you had an allergic reaction to fluconazole, or if you are pregnant. How to Use This Medicine:   Liquid, Tablet  · Your doctor will tell you how much medicine to use. Do not use more than directed. · Oral liquid: Shake well just before each use. Measure the oral liquid medicine with a marked measuring spoon, oral syringe, or medicine cup. · Take all of the medicine in your prescription to clear up your infection, even if you feel better after the first few doses. · Read and follow the patient instructions that come with this medicine. Talk to your doctor or pharmacist if you have any questions. · Missed dose: Take a dose as soon as you remember. If it is almost time for your next dose, wait until then and take a regular dose. Do not take extra medicine to make up for a missed dose. · Store the medicine in a closed container at room temperature, away from heat, moisture, and direct light. Store the oral liquid in the refrigerator or at room temperature and use it within 14 days. Do not freeze. Drugs and Foods to Avoid:   Ask your doctor or pharmacist before using any other medicine, including over-the-counter medicines, vitamins, and herbal products. · Do not use this medicine together with astemizole, cisapride, erythromycin, pimozide, quinidine, or terfenadine. · Some foods and medicines can affect how fluconazole works. Tell your doctor if you are using cimetidine, midazolam, prednisone, rifabutin, rifampin, theophylline, tofacitinib, triazolam, vitamin A supplements, or voriconazole.  Also tell your doctor if you are using any of the following:   ¨ A blood thinner (such as warfarin)  ¨ A diuretic or \"water pill\" (such as hydrochlorothiazide), or blood pressure medicine (such as amlodipine, felodipine, isradipine, losartan, nifedipine)  ¨ Birth control pills  ¨ Cancer medicine (cyclophosphamide, vinblastine, vincristine)  ¨ Diabetes medicine that you take by mouth (glipizide, glyburide, tolbutamide)  ¨ Medicine to lower cholesterol (atorvastatin, fluvastatin, simvastatin)  ¨ Medicine to treat depression (amitriptyline, nortriptyline)  ¨ Medicine to treat HIV/AIDS (saquinavir, zidovudine)  ¨ Medicine to treat malaria (halofantrine)  ¨ Medicine to treat seizures (carbamazepine, phenytoin)  ¨ Medicine that weakens the immune system (cyclosporine, sirolimus, tacrolimus)  ¨ Narcotic pain medicine (alfentanil, fentanyl, methadone)  ¨ Pain or arthritis medicine (aspirin, celecoxib, diclofenac, ibuprofen, naproxen)  Warnings While Using This Medicine:   · It is not safe to take this medicine during pregnancy. It could harm an unborn baby. Tell your doctor right away if you become pregnant. · Tell your doctor if you are breastfeeding, or if you have kidney disease, liver disease, heart disease, heart rhythm problems, cancer, or HIV/AIDS. · This medicine may cause the following problems:   ¨ Liver problems  ¨ Serious skin reactions  ¨ Changes in heart rhythm, such as a condition called QT prolongation  · This medicine may make you dizzy or drowsy. Do not drive or do anything that could be dangerous until you know how this medicine affects you. · Call your doctor if your symptoms do not improve or if they get worse. · Keep all medicine out of the reach of children. Never share your medicine with anyone.   Possible Side Effects While Using This Medicine:   Call your doctor right away if you notice any of these side effects:  · Allergic reaction: Itching or hives, swelling in your face or hands, swelling or tingling in your mouth or throat, chest tightness, trouble breathing  · Blistering, peeling, or red skin rash  · Dark urine or pale stools, nausea, vomiting, loss of appetite, stomach pain, yellow skin or eyes  · Fast, pounding, or uneven heartbeat  · Unusual bleeding, bruising, or weakness  If you notice these less serious side effects, talk with your doctor:   · Headache  · Mild nausea, vomiting, stomach pain, or diarrhea  If you notice other side effects that you think are caused by this medicine, tell your doctor. Call your doctor for medical advice about side effects. You may report side effects to FDA at 8-206-BPG-9082  © 2017 [address]  Information is for End User's use only and may not be sold, redistributed or otherwise used for commercial purposes. The above information is an  only. It is not intended as medical advice for individual conditions or treatments. Talk to your doctor, nurse or pharmacist before following any medical regimen to see if it is safe and effective for you.

## 2018-03-29 DIAGNOSIS — B37.9 CANDIDA ALBICANS INFECTION: Primary | ICD-10-CM

## 2018-03-29 RX ORDER — FLUCONAZOLE 150 MG/1
150 TABLET ORAL DAILY
Qty: 1 TAB | Refills: 0 | Status: SHIPPED | OUTPATIENT
Start: 2018-03-29 | End: 2018-03-30

## 2018-10-23 ENCOUNTER — OFFICE VISIT (OUTPATIENT)
Dept: FAMILY MEDICINE CLINIC | Age: 28
End: 2018-10-23

## 2018-10-23 VITALS
HEART RATE: 78 BPM | OXYGEN SATURATION: 99 % | TEMPERATURE: 97.8 F | BODY MASS INDEX: 24.1 KG/M2 | WEIGHT: 136 LBS | DIASTOLIC BLOOD PRESSURE: 70 MMHG | RESPIRATION RATE: 20 BRPM | SYSTOLIC BLOOD PRESSURE: 104 MMHG | HEIGHT: 63 IN

## 2018-10-23 DIAGNOSIS — M79.674 PAIN OF RIGHT GREAT TOE: Primary | ICD-10-CM

## 2018-10-23 DIAGNOSIS — L60.8 DISCOLORATION OF NAIL: ICD-10-CM

## 2018-10-23 NOTE — PATIENT INSTRUCTIONS
Foot Pain: Care Instructions  Your Care Instructions  Foot injuries that cause pain and swelling are fairly common. Almost all sports or home repair projects can cause a misstep that ends up as foot pain. Normal wear and tear, especially as you get older, also can cause foot pain. Most minor foot injuries will heal on their own, and home treatment is usually all you need to do. If you have a severe injury, you may need tests and treatment. Follow-up care is a key part of your treatment and safety. Be sure to make and go to all appointments, and call your doctor if you are having problems. It's also a good idea to know your test results and keep a list of the medicines you take. How can you care for yourself at home? · Take pain medicines exactly as directed. ? If the doctor gave you a prescription medicine for pain, take it as prescribed. ? If you are not taking a prescription pain medicine, ask your doctor if you can take an over-the-counter medicine. · Rest and protect your foot. Take a break from any activity that may cause pain. · Put ice or a cold pack on your foot for 10 to 20 minutes at a time. Put a thin cloth between the ice and your skin. · Prop up the sore foot on a pillow when you ice it or anytime you sit or lie down during the next 3 days. Try to keep it above the level of your heart. This will help reduce swelling. · Your doctor may recommend that you wrap your foot with an elastic bandage. Keep your foot wrapped for as long as your doctor advises. · If your doctor recommends crutches, use them as directed. · Wear roomy footwear. · As soon as pain and swelling end, begin gentle exercises of your foot. Your doctor can tell you which exercises will help. When should you call for help? Call 911 anytime you think you may need emergency care.  For example, call if:    · Your foot turns pale, white, blue, or cold.    Call your doctor now or seek immediate medical care if:    · You cannot move or stand on your foot.     · Your foot looks twisted or out of its normal position.     · Your foot is not stable when you step down.     · You have signs of infection, such as:  ? Increased pain, swelling, warmth, or redness. ? Red streaks leading from the sore area. ? Pus draining from a place on your foot. ? A fever.     · Your foot is numb or tingly.    Watch closely for changes in your health, and be sure to contact your doctor if:    · You do not get better as expected.     · You have bruises from an injury that last longer than 2 weeks. Where can you learn more? Go to http://anum-dannielle.info/. Enter G947 in the search box to learn more about \"Foot Pain: Care Instructions. \"  Current as of: November 29, 2017  Content Version: 11.8  © 9051-8992 [employer]. Care instructions adapted under license by [email or web address] (which disclaims liability or warranty for this information). If you have questions about a medical condition or this instruction, always ask your healthcare professional. [place] [address] any warranty or liability for your use of this information.

## 2018-10-23 NOTE — PROGRESS NOTES
HISTORY OF PRESENT ILLNESS  John Little is a 25 y.o. female. HPI  John Little is a 25 y.o. female who presents to the office today for toe pain. She comes in today for toe pain. She says this has been going on for several months. She cannot recall an injury. She has pain in her right great toe with walking and pressure. There is toenail discoloration, red and dark on the right great toenail. No swelling. Chief Complaint   Patient presents with    Toe Injury     Patient stated is has a bruise that hurts on big toe       No current outpatient medications on file prior to visit. No current facility-administered medications on file prior to visit. No Known Allergies  No past medical history on file. Social History     Tobacco Use   Smoking Status Never Smoker   Smokeless Tobacco Never Used     Social History     Substance and Sexual Activity   Alcohol Use No     No family history on file. Review of Systems   Musculoskeletal: Positive for joint pain. Right great toe pain   Skin: Negative for rash. Visit Vitals  /70 (BP 1 Location: Right arm, BP Patient Position: Sitting)   Pulse 78   Temp 97.8 °F (36.6 °C) (Oral)   Resp 20   Ht 5' 3\" (1.6 m)   Wt 136 lb (61.7 kg)   LMP 10/21/2018   SpO2 99%   BMI 24.09 kg/m²     Physical Exam   Constitutional: She is oriented to person, place, and time. She appears well-developed and well-nourished. Cardiovascular: Normal rate. Pulmonary/Chest: Effort normal. No respiratory distress. Musculoskeletal: She exhibits no edema. Right foot: There is bony tenderness. There is normal range of motion, normal capillary refill and no deformity. Feet:    Right great toe tender to palpation along medial side of toe and DIP. Toenail discoloration- red line horizontal through nail and dark grey of medial 1/4 of nail. No drainage   Neurological: She is alert and oriented to person, place, and time.    Psychiatric: She has a normal mood and affect. Her behavior is normal.   Nursing note and vitals reviewed. ASSESSMENT and PLAN    ICD-10-CM ICD-9-CM    1. Pain of right great toe M79.674 729.5 XR GREAT TOE RT MIN 2 V   2. Discoloration of nail L60.8 703.8 XR GREAT TOE RT MIN 2 V      XR with no obvious fracture. If negative, I will refer to podiatry for nail management. Reviewed medication and side effects. Patient agrees with the plan and verbalizes understanding. Follow-up Disposition:  Return if symptoms worsen or fail to improve.     Vickie Taylor PA-C  10/23/2018

## 2018-10-23 NOTE — PROGRESS NOTES
Salvador Morris is a 25 y.o. female presented in clinic today for   Chief Complaint   Patient presents with    Toe Injury     Patient stated is has a bruise that hurts on big toe     1. Have you been to the ER, urgent care clinic since your last visit? Hospitalized since your last visit? No    2. Have you seen or consulted any other health care providers outside of the [address] [place] [address] since your last visit? Include any pap smears or colon screening.  No

## 2019-01-10 ENCOUNTER — OFFICE VISIT (OUTPATIENT)
Dept: FAMILY MEDICINE CLINIC | Age: 29
End: 2019-01-10

## 2019-01-10 VITALS
HEART RATE: 90 BPM | SYSTOLIC BLOOD PRESSURE: 113 MMHG | WEIGHT: 136 LBS | OXYGEN SATURATION: 99 % | BODY MASS INDEX: 24.1 KG/M2 | TEMPERATURE: 98.5 F | HEIGHT: 63 IN | DIASTOLIC BLOOD PRESSURE: 72 MMHG | RESPIRATION RATE: 18 BRPM

## 2019-01-10 DIAGNOSIS — Z11.3 SCREENING FOR STD (SEXUALLY TRANSMITTED DISEASE): ICD-10-CM

## 2019-01-10 DIAGNOSIS — N89.8 VAGINAL ITCHING: ICD-10-CM

## 2019-01-10 DIAGNOSIS — N89.8 VAGINAL DISCHARGE: Primary | ICD-10-CM

## 2019-01-10 DIAGNOSIS — N89.8 VAGINAL DISCHARGE: ICD-10-CM

## 2019-01-10 LAB
HCG URINE, QL. (POC): NEGATIVE
VALID INTERNAL CONTROL?: YES

## 2019-01-10 NOTE — PROGRESS NOTES
HISTORY OF PRESENT ILLNESS  Joleen Collins is a 25 y.o. female. HPI  Joleen Collins is a 25 y.o. female who presents to the office today for vaginal discharge. She comes in with c/o vaginal discharge x 3 weeks. There is itching. She denies odor, burning, painful intercourse. There are no UTI symptoms. She tried Monistat 1 Day 2 weeks ago which did not help. She has been using a new soap. Chief Complaint   Patient presents with    Vaginal Discharge       No current outpatient medications on file prior to visit. No current facility-administered medications on file prior to visit. No Known Allergies  No past medical history on file. Social History     Tobacco Use   Smoking Status Never Smoker   Smokeless Tobacco Never Used     Social History     Substance and Sexual Activity   Alcohol Use No     No family history on file. Review of Systems   Constitutional: Negative for fever and malaise/fatigue. Gastrointestinal: Negative for abdominal pain, nausea and vomiting. Genitourinary: Negative for dysuria, flank pain and frequency. Vaginal discharge and itching   Musculoskeletal: Negative for back pain. Neurological: Negative for headaches. Visit Vitals  /72 (BP 1 Location: Left arm, BP Patient Position: Sitting)   Pulse 90   Temp 98.5 °F (36.9 °C) (Oral)   Resp 18   Ht 5' 3\" (1.6 m)   Wt 136 lb (61.7 kg)   LMP 01/02/2019   SpO2 99%   BMI 24.09 kg/m²     Physical Exam   Constitutional: She is oriented to person, place, and time. She appears well-developed and well-nourished. No distress. Cardiovascular: Normal rate. Pulmonary/Chest: Effort normal. No respiratory distress. Abdominal: There is no tenderness. Genitourinary: Uterus normal. Pelvic exam was performed with patient supine. There is no rash or lesion on the right labia. There is no rash or lesion on the left labia. Cervix exhibits no motion tenderness, no discharge and no friability.  Right adnexum displays no mass and no tenderness. Left adnexum displays no mass and no tenderness. No erythema, tenderness or bleeding in the vagina. Vaginal discharge found. Genitourinary Comments: Thin milky discharge, no odor   Neurological: She is alert and oriented to person, place, and time. Psychiatric: She has a normal mood and affect. Her behavior is normal.   Nursing note and vitals reviewed. ASSESSMENT and PLAN    ICD-10-CM ICD-9-CM    1. Vaginal discharge N89.8 623.5 BV+CT+NG+TV BY JUAN + YEAST      AMB POC URINE PREGNANCY TEST, VISUAL COLOR COMPARISON   2. Vaginal itching N89.8 698.1 BV+CT+NG+TV BY JUAN + YEAST      AMB POC URINE PREGNANCY TEST, VISUAL COLOR COMPARISON   3. Screening for STD (sexually transmitted disease) Z11.3 V74.5 BV+CT+NG+TV BY JUAN + YEAST      AMB POC URINE PREGNANCY TEST, VISUAL COLOR COMPARISON        Will send off swab and call with results. I suggest stopping new soap. Do not use perfume soaps. No bubble baths. Patient agrees with the plan and verbalizes understanding.      Follow-up Disposition: Not on File    De Jackson  1/10/2019

## 2019-01-10 NOTE — PROGRESS NOTES
Aleshia Chaudhari is a 25 y.o. female c/o vaginal discharge x about 3 weeks with some itching, she tried the OTC 1-day monistat with no relief

## 2019-01-10 NOTE — PATIENT INSTRUCTIONS
Vaginitis: Care Instructions  Your Care Instructions    Vaginitis is soreness or infection of the vagina. This common problem can cause itching and burning. And it can cause a change in vaginal discharge. Sometimes it can cause pain during sex. Vaginitis may be caused by bacteria, yeast, or other germs. Some infections that cause it are caught from a sexual partner. Bath products, spermicides, and douches can irritate the vagina too. Some women have this problem during and after menopause. A drop in estrogen levels during this time can cause dryness, soreness, and pain during sex. Your doctor can give you medicine to treat an infection. And home care may help you feel better. For certain types of infections, your sex partner must be treated too. Follow-up care is a key part of your treatment and safety. Be sure to make and go to all appointments, and call your doctor if you are having problems. It's also a good idea to know your test results and keep a list of the medicines you take. How can you care for yourself at home? · If your doctor prescribed antibiotics, take them as directed. Do not stop taking them just because you feel better. You need to take the full course of antibiotics. · Take your medicines exactly as prescribed. Call your doctor if you think you are having a problem with your medicine. · Do not eat or drink anything that has alcohol if you are taking metronidazole (Flagyl). · If you have a yeast infection, use over-the-counter products as your doctor tells you to. Or take medicine your doctor prescribes exactly as directed. · Wash your vaginal area daily with water. You also can use a mild, unscented soap if you want. · Do not use scented bath products. And do not use vaginal sprays or douches. · Put a washcloth soaked in cool water on the area to relieve itching. Or you can take cool baths.   · If you have dryness because of menopause, use estrogen cream or pills that your doctor prescribes. · Ask your doctor about when it is okay to have sex. · Use a personal lubricant before sex if you have dryness. Examples are Astroglide, K-Y Jelly, and Wet Lubricant Gel. · Ask your doctor if your sex partner also needs treatment. When should you call for help? Call your doctor now or seek immediate medical care if:    · You have a fever and pelvic pain.    Watch closely for changes in your health, and be sure to contact your doctor if:    · You have bleeding other than your period.     · You do not get better as expected. Where can you learn more? Go to http://anum-dannielle.info/. Enter P453 in the search box to learn more about \"Vaginitis: Care Instructions. \"  Current as of: May 15, 2018  Content Version: 11.8  © 2700-5980 [employer]. Care instructions adapted under license by [employer] (which disclaims liability or warranty for this information). If you have questions about a medical condition or this instruction, always ask your healthcare professional. [address] any warranty or liability for your use of this information.

## 2019-01-14 LAB
BACTERIAL VAGINOSIS, NAA: POSITIVE
CANDIDA ALBICANS, NAA, 180056: NEGATIVE
CANDIDA GLABRATA, NAA, 180057: NEGATIVE
CANDIDA KRUSEI, NAA, 180016: NEGATIVE
CHLAMYDIA TRACHOMATIS, NAA, 180097: NEGATIVE
NEISSERIA GONORRHOEAE, NAA, 180104: NEGATIVE
TRICH VAG BY NAA, 180087: NEGATIVE

## 2019-01-15 DIAGNOSIS — N76.0 BV (BACTERIAL VAGINOSIS): Primary | ICD-10-CM

## 2019-01-15 DIAGNOSIS — B96.89 BV (BACTERIAL VAGINOSIS): Primary | ICD-10-CM

## 2019-01-15 RX ORDER — METRONIDAZOLE 7.5 MG/G
1 GEL VAGINAL
Qty: 187.5 MG | Refills: 0 | Status: SHIPPED | OUTPATIENT
Start: 2019-01-15 | End: 2019-01-20

## 2019-01-16 ENCOUNTER — TELEPHONE (OUTPATIENT)
Dept: FAMILY MEDICINE CLINIC | Age: 29
End: 2019-01-16

## 2019-01-16 NOTE — TELEPHONE ENCOUNTER
Patient returned call, lab results given to patient and informed of medication sent, patient voiced understanding.

## 2019-05-22 ENCOUNTER — OFFICE VISIT (OUTPATIENT)
Dept: FAMILY MEDICINE CLINIC | Age: 29
End: 2019-05-22

## 2019-05-22 VITALS
TEMPERATURE: 97.9 F | DIASTOLIC BLOOD PRESSURE: 76 MMHG | OXYGEN SATURATION: 100 % | HEIGHT: 63 IN | HEART RATE: 69 BPM | RESPIRATION RATE: 18 BRPM | WEIGHT: 142 LBS | BODY MASS INDEX: 25.16 KG/M2 | SYSTOLIC BLOOD PRESSURE: 113 MMHG

## 2019-05-22 DIAGNOSIS — N92.0 MENORRHAGIA WITH REGULAR CYCLE: Primary | ICD-10-CM

## 2019-05-22 DIAGNOSIS — N84.0 ENDOMETRIAL POLYP: ICD-10-CM

## 2019-05-22 DIAGNOSIS — D25.9 UTERINE LEIOMYOMA, UNSPECIFIED LOCATION: ICD-10-CM

## 2019-05-22 DIAGNOSIS — N83.201 CYST OF RIGHT OVARY: ICD-10-CM

## 2019-05-22 NOTE — PROGRESS NOTES
HISTORY OF PRESENT ILLNESS  Sweetie Peres is a 25 y.o. female. HPI  Sweetie Peres is a 25 y.o. female who presents to the office today for fibroids  She comes in today for worsening menstrual cycles. She passed a large blood clot last month and went to the ED. A transvaginal US showed small uterine fibroids, 10 mm endometrial polyp and a cyst on the right ovary. She states her cycles have been regular but very heavy, passing large clots, nausea, worsening abdominal cramps and low back pain. She does not have a GYN. Chief Complaint   Patient presents with    Fibroids       No current outpatient medications on file prior to visit. No current facility-administered medications on file prior to visit. No Known Allergies  History reviewed. No pertinent past medical history. Social History     Tobacco Use   Smoking Status Never Smoker   Smokeless Tobacco Never Used     Social History     Substance and Sexual Activity   Alcohol Use No     History reviewed. No pertinent family history. Review of Systems   Constitutional: Negative for malaise/fatigue. Gastrointestinal: Positive for abdominal pain and nausea. Genitourinary:        Heavy menstrual cycles with worsening cramps   Neurological: Negative for dizziness and loss of consciousness. Endo/Heme/Allergies: Does not bruise/bleed easily. Visit Vitals  /76 (BP 1 Location: Left arm, BP Patient Position: Sitting)   Pulse 69   Temp 97.9 °F (36.6 °C) (Oral)   Resp 18   Ht 5' 3\" (1.6 m)   Wt 142 lb (64.4 kg)   LMP 04/16/2019   SpO2 100%   BMI 25.15 kg/m²     Physical Exam   Constitutional: She is oriented to person, place, and time. She appears well-developed and well-nourished. No distress. Cardiovascular: Normal rate, regular rhythm and normal heart sounds. Pulmonary/Chest: Effort normal. No respiratory distress. Abdominal: Soft. Normal appearance. She exhibits no distension and no mass. There is no tenderness.    Musculoskeletal: She exhibits no edema. Neurological: She is alert and oriented to person, place, and time. Skin: Skin is warm and dry. Psychiatric: She has a normal mood and affect. Her behavior is normal.   Nursing note and vitals reviewed. Vaginal US 4/5/2019:  Uterus is retroverted. Uterus measures 10.2 x 6.8 x 4.6 cm. Endometrium measures  7 mm. Small amounts of fluid is seen in the endometrium. 8 x 4 x 10 mm echogenic  structure in the endometrium suggestive of endometrial polyp. There are uterine  fibroids, largest measures approximately 8 x 8 x 9 mm.     Right ovary measures 4.3 x 4 x 3 cm. 2.9 x 2.5 x 2.7 cm simple cyst right ovary. Left ovary measures 2.5 x 2.1 x 1.3 cm. On color-flow duplex interrogation there  is blood for to both ovaries. No adnexal masses or free fluid.     IMPRESSION  IMPRESSION:  1. 10 mm endometrial polyp  2. Small uterine fibroids. 3. 2.9 cm simple cyst right ovary. ASSESSMENT and PLAN    ICD-10-CM ICD-9-CM    1. Menorrhagia with regular cycle N92.0 626.2 REFERRAL TO GYNECOLOGY   2. Endometrial polyp N84.0 621.0 REFERRAL TO GYNECOLOGY   3. Uterine leiomyoma, unspecified location D25.9 218.9 REFERRAL TO GYNECOLOGY   4. Cyst of right ovary N83.201 620.2 REFERRAL TO GYNECOLOGY      Will send her to GYN to discuss treatment options available. Patient agrees with the plan and verbalizes understanding. Follow-up and Dispositions    · Return if symptoms worsen or fail to improve.        Joan Hein PA-C  5/22/2019

## 2019-05-22 NOTE — PROGRESS NOTES
Madyson Jarrett is a 25 y.o. female c/o abdominal pain and heavy menstrual cycle. After her cycle in March she lifted something heavy and felt a gush of blood then had pain. She was seen at [facility] and has an ultrasound, they found that she has fibroids on ovaries. Since then her cycle has been heavier and she has pain in area of ovaries.

## 2019-05-22 NOTE — PATIENT INSTRUCTIONS
Uterine Fibroids: Care Instructions  Your Care Instructions    Uterine fibroids are growths in the uterus. Fibroids aren't cancer. Doctors don't know what causes fibroids. Fibroids are very common in women during their childbearing years. Fibroids can grow on the inside of the uterus, in the muscle wall of the uterus, or near the outside wall of the uterus. In some women, fibroids cause painful cramps and heavy periods. In these cases, taking anti-inflammatory medicines, birth control pills, or using an intrauterine device (IUD) often helps decrease symptoms. Sometimes surgery is needed to treat fibroids. But if you are near menopause, you may want to wait and see if your symptoms get better. Most fibroids shrink and go away after menopause, when your menstrual periods stop completely. Follow-up care is a key part of your treatment and safety. Be sure to make and go to all appointments, and call your doctor if you are having problems. It's also a good idea to know your test results and keep a list of the medicines you take. How can you care for yourself at home? · If your doctor gave you medicine, take it as exactly as prescribed. Be safe with medicines. Call your doctor if you think you are having a problem with your medicine. · Take anti-inflammatory medicines for pain. These include ibuprofen (Advil, Motrin) and naproxen (Aleve). Read and follow all instructions on the label. · Use heat, such as a hot water bottle or a heating pad set on low, or a warm bath to relax tense muscles and relieve cramping. Put a thin cloth between the heating pad and your skin. Never go to sleep with a heating pad on. · Lie down and put a pillow under your knees. Or, lie on your side and bring your knees up to your chest. These positions may help relieve belly pain or pressure. · Keep track of how many sanitary pads or tampons you use each day. · Get at least 30 minutes of exercise on most days of the week.  Walking is a good choice. You also may want to do other activities, such as running, swimming, cycling, or playing tennis or team sports. · If you bleed longer than usual or have heavy bleeding, take a daily multivitamin with iron. When should you call for help? Call your doctor now or seek immediate medical care if:    · You have severe vaginal bleeding.     · You have new or worse belly or pelvic pain.    Watch closely for changes in your health, and be sure to contact your doctor if:    · You have unusual vaginal bleeding.     · You do not get better as expected. Where can you learn more? Go to http://anum-dannielle.info/. Enter B121 in the search box to learn more about \"Uterine Fibroids: Care Instructions. \"  Current as of: May 14, 2018  Content Version: 11.9  © 5766-7042 [employer]. Care instructions adapted under license by [employer] (which disclaims liability or warranty for this information). If you have questions about a medical condition or this instruction, always ask your healthcare professional. [address] any warranty or liability for your use of this information.

## 2019-07-08 DIAGNOSIS — L70.9 ACNE, UNSPECIFIED ACNE TYPE: Primary | ICD-10-CM

## 2019-07-08 RX ORDER — ADAPALENE 0.1 G/100G
CREAM TOPICAL
Qty: 45 G | Refills: 0 | Status: SHIPPED | OUTPATIENT
Start: 2019-07-08

## 2021-07-13 NOTE — PATIENT INSTRUCTIONS
Acne: Care Instructions  Your Care Instructions  Acne is a skin problem that shows up as blackheads, whiteheads, and pimples. It most often affects the face, neck, and upper body. Acne occurs when oil and dead skin cells clog the skin's pores. Acne usually starts during the teen years and often lasts into adulthood. Gentle cleansing every day controls most mild acne. If home treatment does not work, your doctor may prescribe creams, antibiotics, or a stronger medicine called isotretinoin. Sometimes birth control pills help women who have monthly acne flare-ups. Follow-up care is a key part of your treatment and safety. Be sure to make and go to all appointments, and call your doctor if you are having problems. Its also a good idea to know your test results and keep a list of the medicines you take. How can you care for yourself at home? · Gently wash your face 1 or 2 times a day with warm (not hot) water and a mild soap or cleanser. Always rinse well. · Use an over-the-counter lotion or gel that contains benzoyl peroxide. Start with a small amount of 2.5% benzoyl peroxide and increase the strength as needed. Benzoyl peroxide works well for acne, but you may need to use it for up to 2 months before your acne starts to improve. · Apply acne cream, lotion, or gel to all the places you get pimples, blackheads, or whiteheads, not just where you have them now. Follow the instructions carefully. If your skin gets too dry and scaly or red and sore, reduce the amount. For the best results, apply medicines as directed. Try not to miss doses. · Do not squeeze or pick pimples and blackheads. This can cause infection and scarring. · Use only oil-free makeup, sunscreen, and other skin care products that will not clog your pores. · Wash your hair every day, and try to keep it off your face and shoulders. Consider pinning it back or cutting it short. When should you call for help?   Watch closely for changes in your Chief complaint:   Chief Complaint   Patient presents with   • Vomiting     Vomiting x 2 months. Nausea with eating and drinking.Indigestion after eating. Denies abd pain or diarrhea.        Vitals:  Visit Vitals  /65   Pulse 67   Temp 96.3 °F (35.7 °C) (Tympanic)   Resp 16   LMP 06/26/2021   SpO2 98%       HISTORY OF PRESENT ILLNESS     HPI   Jenifer Snow presents to the walk in clinic with   Chief Complaint   Patient presents with   • Vomiting     Vomiting x 2 months. Nausea with eating and drinking.Indigestion after eating. Denies abd pain or diarrhea.     and is accompanied by patient.  Patient reports vomiting with meals.  Patient reports this happens any time she “eats anything” she reports she can handle crackers and soup morning but anything else of substance makes her vomit.  Patient reports the vomit is yellow and liquid in nature.  Patient denies abdominal pain, diarrhea, fevers.  Patient reports no change in diet at onset.  Patient denies history of irritable bowel disease, ulcerative colitis, Crohn's disease, celiac disease, dietary allergies.  No recent travel, no home exposures to other viral illness.  Patient denies cough, nasal congestion.  Patient denies any blood in the vomit.  Patient had last menstrual period on June 26th with no concerns for pregnancy.  Patient reports she does have dizziness at times.  Patient was able to drink water but anything more than water that is liquid will also cause her to vomit.  Patient states symptoms have been ongoing for approximately 2 months and she was unable to see her doctor as her doctor early out on maternity leave.  Patient is also being treated by Psychiatry as she has a history of self-harm. Denies marijuana use.  Patient reports she was 180# approximately one month ago and now is about 145-150# reported, when actual weight today is 161.2# chart review shows that in the last calendar year multiple ER evaluations for abdominal pain and  health, and be sure to contact your doctor if:  · You have tried home treatment for 6 to 8 weeks and your acne is not better or gets worse. Your doctor may need to add to or change your treatment. · Your pimples become large and hard or filled with fluid. · Scars form after pimples heal.  · You feel sad or hopeless, lack energy, or have other signs of depression while you are taking the prescription medicine isotretinoin. · You start to have other symptoms, such as facial hair growth in women or bone and muscle pain. Where can you learn more? Go to http://anum-dannielle.info/. Enter V108 in the search box to learn more about \"Acne: Care Instructions. \"  Current as of: October 13, 2016  Content Version: 11.3  © 6838-5584 [employer]. Care instructions adapted under license by [employer] (which disclaims liability or warranty for this information). If you have questions about a medical condition or this instruction, always ask your healthcare professional. [address] any warranty or liability for your use of this information. vomiting.  Per chart review on 10- mother reports similar symptoms ongoing for 2 years.  Patient does endorse a sore throat recently no rash.  Patient reports this tried some Pepto-Bismol with no relief.      Other significant problems:  Patient Active Problem List    Diagnosis Date Noted   • Acne vulgaris 03/20/2021     Priority: Low   • PTSD (post-traumatic stress disorder) 11/12/2020     Priority: Low   • Smoker 12/13/2017     Priority: Low   • RAD (reactive airway disease)      Priority: Low       PAST MEDICAL, FAMILY AND SOCIAL HISTORY     Medications:  Current Outpatient Medications   Medication   • tretinoin (Retin-A) 0.1 % cream   • albuterol-ipratropium 2.5 mg/0.5 mg (DUONEB) 0.5-2.5 (3) MG/3ML nebulizer solution   • albuterol 108 (90 Base) MCG/ACT inhaler   • drospirenone-ethinyl estradiol (ACE) 3-0.02 MG per tablet     No current facility-administered medications for this visit.       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    Seen at [facility]'s T1-T4 left 18 degree; curve 2 T10-L4 7 degree       Past Surgical History:   Procedure Laterality Date   • Dental surgery         Family History:  Family History   Problem Relation Age of Onset   • Cancer Mother         multiple myloma   • Heart disease Father    • Diabetes Maternal Grandmother        Social History:  Social History     Tobacco Use   • Smoking status: Former Smoker     Packs/day: 0.25   • Smokeless tobacco: Never Used   Substance Use Topics   • Alcohol use: Not Currently     Alcohol/week: 0.0 standard drinks     Comment: past vodka  user       REVIEW OF SYSTEMS     Review of Systems   Constitutional: Negative for appetite change and fever.   HENT: Positive for sore throat.    Cardiovascular: Negative for chest pain.   Gastrointestinal: Positive for nausea and vomiting. Negative for abdominal pain, blood in stool and diarrhea.   Genitourinary: Negative for dysuria and hematuria.       PHYSICAL EXAM     Physical Exam  Vitals and nursing note reviewed.   Constitutional:       General: She is not in acute distress.     Appearance: She is not ill-appearing.      Comments: Non toxic, well appearing, no acute distress   HENT:      Head: Atraumatic.      Mouth/Throat:      Pharynx: Uvula midline. Posterior oropharyngeal erythema present.      Comments: Erythematous vesicular lesions in the oropharynx.  Eyes:      Extraocular Movements: Extraocular movements intact.      Conjunctiva/sclera: Conjunctivae normal.   Pulmonary:      Effort: Pulmonary effort is normal.   Abdominal:      General: Bowel sounds are normal. There is no distension.      Palpations: Abdomen is soft.      Tenderness: There is no abdominal tenderness. There is no right CVA tenderness, left CVA tenderness, guarding or rebound. Negative signs include McBurney's sign.      Hernia: There is no hernia in the umbilical area.   Musculoskeletal:         General: Normal range of motion.      Cervical back: Normal range of motion.   Skin:     General: Skin is warm.      Comments: Multiple well-healed scars across the left and right forearms in similar size, symmetrical pattern and shape  No rash noted on the hands or the feet   Neurological:      Mental Status: She is alert.         ASSESSMENT/PLAN     Jenifer was seen today for vomiting.    Diagnoses and all orders for this visit:    Vomiting, intractability of vomiting not specified, presence of nausea not specified, unspecified vomiting type  -     POCT URINE PREGNANCY  -     CBC WITH DIFFERENTIAL  -     COMPREHENSIVE METABOLIC PANEL  -      GROUP A STREP THROAT PCR  -     SERVICE TO GASTROENTEROLOGY      Results for orders placed or performed in visit on 07/13/21   COMPREHENSIVE METABOLIC PANEL   Result Value Ref Range    Fasting Status      Sodium 139 135 - 145 mmol/L    Potassium 4.0 3.4 - 5.1 mmol/L    Chloride 103 98 - 107 mmol/L    Carbon Dioxide 22 21 - 32 mmol/L    Anion Gap 18 10 - 20 mmol/L    Glucose 90 65 - 99 mg/dL    BUN 6 6 - 20 mg/dL    Creatinine 0.79 0.51 - 0.95 mg/dL    Glomerular Filtration Rate >90 >90 mL/min/1.73m2    BUN/ Creatinine Ratio 8 7 - 25    Calcium 9.3 8.4 - 10.2 mg/dL    Bilirubin, Total 0.7 0.2 - 1.0 mg/dL    GOT/AST 17 <=37 Units/L    GPT/ALT 19 6 - 35 Units/L    Alkaline Phosphatase 70 42 - 110 Units/L    Albumin 4.2 3.6 - 5.1 g/dL    Protein, Total 7.9 6.4 - 8.2 g/dL    Globulin 3.7 2.0 - 4.0 g/dL    A/G Ratio 1.1 1.0 - 2.4   CBC WITH AUTOMATED DIFFERENTIAL (PERFORMABLE ONLY)   Result Value Ref Range    WBC 7.7 4.2 - 11.0 K/mcL    RBC 4.57 4.00 - 5.20 mil/mcL    HGB 13.6 12.0 - 15.5 g/dL    HCT 39.0 36.0 - 46.5 %    MCV 85.3 78.0 - 100.0 fl    MCH 29.8 26.0 - 34.0 pg    MCHC 34.9 32.0 - 36.5 g/dL    RDW-CV 13.1 11.0 - 15.0 %    RDW-SD 39.9 39.0 - 50.0 fL     140 - 450 K/mcL    Neutrophil, Percent 52 %    Lymphocytes, Percent 29 %    Mono, Percent 9 %    Eosinophils, Percent 10 %    Basophils, Percent 0 %    Absolute Neutrophils 4.0 1.8 - 8.0 K/mcL    Absolute Lymphocytes 2.2 1.2 - 5.2 K/mcL    Absolute Monocytes 0.7 0.3 - 0.9 K/mcL    Absolute Eosinophils  0.8 (H) 0.0 - 0.5 K/mcL    Absolute Basophils 0.0 0.0 - 0.3 K/mcL   POCT URINE PREGNANCY   Result Value Ref Range    URINE PREGNANCY,QUAL Negative Negative    Internal Procedural Controls Acceptable Yes    POCT URINE DIP AUTO   Result Value Ref Range    POCT Color Yellow     POCT Appearance Clear     POCT Glucose Urine Negative Negative mg/dL    POCT Bilirubin Negative Negative    POCT Ketones Negative Negative mg/dL    POCT Specific Gravity 1.025 1.005 -  1.030    POCT Occult Blood Small (A) Negative    POCT pH 5.5 5 - 7    POCT Protein Negative Negative, 30 mg/dL, 100 mg/dL, 300 mg/dL, >=2000 mg/dL, 15 mg/dL, 250 mg/dL, >= 300 mg/dL mg/dL    POCT Urobilinogen 0.2 0.0 - 1.0 mg/dL    Urine Nitrite Negative Negative    WBC (Leukocyte) Esterase POC Small (A) Negative   GROUP A STREP THROAT PCR    Specimen: Throat; Swab   Result Value Ref Range    STREPTOCOCCUS GROUP A PCR Not Detected Not Detected       Patient complaining of vomiting after eating or drinking for approximately 2 months.  Chart review shows this is acute on chronic for the patient.  Patient vitals are stable, she is nontoxic in appearance.  Documented weight on 3/19/21 was 62 kg and documented weight today was 73kg. Abdominal exam is reassuring, low risk for surgical emergency at this time given no signs of acute abdomen.  CBC, CMP strep, Urine dip and urine preg oral ordered. CBC, CMP, strep are all negative, low risk for anemia, electrolyte imbalance, or strep.  Differentials include but limited to:  Gastritis, H pylori, Dietary allergies such as lactose intolerance, celiac disease, inflammatory bowel disease such as Crohn's disease, eating disorder/somatic disorder, less likely based on age colon cancer.  Referral placed for GI.  Patient was encouraged to follow-up with primary care for further evaluation and treatment.  Patient will be started on Zofran and omeprazole for symptomatic therapy.  Encouraged to continue with a bland diet. Return for re-evaluation or present to the Emergency Department if worsening or new symptoms such as change in abdominal pain location, character, intensity or frequency; worsening nausea/vomiting/diarrhea, bloody stools, fever, unable to tolerate food and/or drink, etc.   Plan of care was discussed with patient and family. Patient is in agreement to plan. Patient is advised to follow up with their physician. Patient is advised to go to the Emergency Room if symptoms  worsen.   Supervising physician is Dr. Annie Lester.

## 2022-02-12 PROBLEM — N85.A UTERINE SCAR FROM PREVIOUS CESAREAN DELIVERY: Status: ACTIVE | Noted: 2022-02-12

## 2022-02-12 PROBLEM — O46.93 VAGINAL BLEEDING IN PREGNANCY, THIRD TRIMESTER: Status: ACTIVE | Noted: 2022-02-12

## 2022-02-12 PROBLEM — O34.219 PATIENT DESIRES VAGINAL BIRTH AFTER CESAREAN SECTION (VBAC): Status: ACTIVE | Noted: 2022-02-12

## 2022-02-13 PROBLEM — O36.8390 NON-REASSURING ELECTRONIC FETAL MONITORING TRACING: Status: ACTIVE | Noted: 2022-02-13

## 2022-03-18 PROBLEM — M79.646 THUMB PAIN: Status: ACTIVE | Noted: 2017-09-06

## 2022-03-18 PROBLEM — O46.93 VAGINAL BLEEDING IN PREGNANCY, THIRD TRIMESTER: Status: ACTIVE | Noted: 2022-02-12

## 2022-03-18 PROBLEM — N85.A UTERINE SCAR FROM PREVIOUS CESAREAN DELIVERY: Status: ACTIVE | Noted: 2022-02-12

## 2022-03-18 PROBLEM — O36.8390 NON-REASSURING ELECTRONIC FETAL MONITORING TRACING: Status: ACTIVE | Noted: 2022-02-13

## 2022-03-19 PROBLEM — L70.0 CLOSED COMEDONE: Status: ACTIVE | Noted: 2017-09-20

## 2022-03-19 PROBLEM — L70.9 ACNE: Status: ACTIVE | Noted: 2017-09-20

## 2022-03-19 PROBLEM — O34.219 PATIENT DESIRES VAGINAL BIRTH AFTER CESAREAN SECTION (VBAC): Status: ACTIVE | Noted: 2022-02-12

## 2022-03-19 PROBLEM — S69.90XA HAND INJURY: Status: ACTIVE | Noted: 2017-09-06

## 2022-03-20 PROBLEM — G43.719 INTRACTABLE CHRONIC MIGRAINE WITHOUT AURA AND WITHOUT STATUS MIGRAINOSUS: Status: ACTIVE | Noted: 2017-06-06

## 2023-01-31 RX ORDER — IBUPROFEN 600 MG/1
600 TABLET ORAL EVERY 6 HOURS PRN
COMMUNITY
Start: 2022-02-15

## 2023-01-31 RX ORDER — ADAPALENE 0.1 G/100G
CREAM TOPICAL
COMMUNITY
Start: 2019-07-08